# Patient Record
Sex: FEMALE | Race: OTHER | HISPANIC OR LATINO | Employment: UNEMPLOYED | URBAN - METROPOLITAN AREA
[De-identification: names, ages, dates, MRNs, and addresses within clinical notes are randomized per-mention and may not be internally consistent; named-entity substitution may affect disease eponyms.]

---

## 2017-01-07 ENCOUNTER — HOSPITAL ENCOUNTER (OUTPATIENT)
Dept: CT IMAGING | Facility: HOSPITAL | Age: 37
Discharge: HOME | End: 2017-01-07
Payer: COMMERCIAL

## 2017-01-07 DIAGNOSIS — R10.9 ABDOMINAL PAIN: ICD-10-CM

## 2017-01-07 DIAGNOSIS — R10.11 RIGHT UPPER QUADRANT PAIN: ICD-10-CM

## 2017-01-07 DIAGNOSIS — R10.33 PERIUMBILICAL PAIN: ICD-10-CM

## 2017-01-07 PROCEDURE — 74177 CT ABD & PELVIS W/CONTRAST: CPT

## 2017-01-07 RX ADMIN — IOHEXOL 100 ML: 350 INJECTION, SOLUTION INTRAVENOUS at 10:15

## 2017-01-09 ENCOUNTER — GENERIC CONVERSION - ENCOUNTER (OUTPATIENT)
Dept: OTHER | Facility: OTHER | Age: 37
End: 2017-01-09

## 2017-01-09 DIAGNOSIS — R10.84 GENERALIZED ABDOMINAL PAIN: ICD-10-CM

## 2017-02-07 ENCOUNTER — HOSPITAL ENCOUNTER (EMERGENCY)
Facility: HOSPITAL | Age: 37
Discharge: HOME | End: 2017-02-08
Attending: EMERGENCY MEDICINE | Admitting: EMERGENCY MEDICINE
Payer: COMMERCIAL

## 2017-02-07 DIAGNOSIS — K52.9 GASTROENTERITIS: Primary | ICD-10-CM

## 2017-02-07 LAB
ALBUMIN SERPL BCP-MCNC: 4.1 G/DL (ref 3.5–5)
ALP SERPL-CCNC: 69 U/L (ref 46–116)
ALT SERPL W P-5'-P-CCNC: 33 U/L (ref 12–78)
ANION GAP SERPL CALCULATED.3IONS-SCNC: 12 MMOL/L (ref 4–13)
AST SERPL W P-5'-P-CCNC: 23 U/L (ref 5–45)
BASOPHILS # BLD MANUAL: 0 THOUSAND/UL (ref 0–0.1)
BASOPHILS NFR MAR MANUAL: 0 % (ref 0–1)
BILIRUB SERPL-MCNC: 1.3 MG/DL (ref 0.2–1)
BUN SERPL-MCNC: 15 MG/DL (ref 5–25)
CALCIUM SERPL-MCNC: 8.6 MG/DL (ref 8.3–10.1)
CHLORIDE SERPL-SCNC: 100 MMOL/L (ref 100–108)
CO2 SERPL-SCNC: 25 MMOL/L (ref 21–32)
CREAT SERPL-MCNC: 0.76 MG/DL (ref 0.6–1.3)
EOSINOPHIL # BLD MANUAL: 0.1 THOUSAND/UL (ref 0–0.4)
EOSINOPHIL NFR BLD MANUAL: 1 % (ref 0–6)
ERYTHROCYTE [DISTWIDTH] IN BLOOD BY AUTOMATED COUNT: 12.4 % (ref 11.6–15.1)
GFR SERPL CREATININE-BSD FRML MDRD: >60 ML/MIN/1.73SQ M
GLUCOSE SERPL-MCNC: 130 MG/DL (ref 65–140)
HCT VFR BLD AUTO: 38.4 % (ref 34.8–46.1)
HGB BLD-MCNC: 12.8 G/DL (ref 11.5–15.4)
LIPASE SERPL-CCNC: 111 U/L (ref 73–393)
LYMPHOCYTES # BLD AUTO: 0.81 THOUSAND/UL (ref 0.6–4.47)
LYMPHOCYTES # BLD AUTO: 8 % (ref 14–44)
MCH RBC QN AUTO: 29.2 PG (ref 26.8–34.3)
MCHC RBC AUTO-ENTMCNC: 33.3 G/DL (ref 31.4–37.4)
MCV RBC AUTO: 88 FL (ref 82–98)
MONOCYTES # BLD AUTO: 0.1 THOUSAND/UL (ref 0–1.22)
MONOCYTES NFR BLD: 1 % (ref 4–12)
NEUTROPHILS # BLD MANUAL: 9.05 THOUSAND/UL (ref 1.85–7.62)
NEUTS BAND NFR BLD MANUAL: 4 % (ref 0–8)
NEUTS SEG NFR BLD AUTO: 85 % (ref 43–75)
PLATELET # BLD AUTO: 144 THOUSANDS/UL (ref 149–390)
PLATELET BLD QL SMEAR: ADEQUATE
PMV BLD AUTO: 11.3 FL (ref 8.9–12.7)
POTASSIUM SERPL-SCNC: 3.4 MMOL/L (ref 3.5–5.3)
PROT SERPL-MCNC: 7.7 G/DL (ref 6.4–8.2)
RBC # BLD AUTO: 4.39 MILLION/UL (ref 3.81–5.12)
SODIUM SERPL-SCNC: 137 MMOL/L (ref 136–145)
TOTAL CELLS COUNTED SPEC: 100
VARIANT LYMPHS # BLD AUTO: 1 %
WBC # BLD AUTO: 10.17 THOUSAND/UL (ref 4.31–10.16)

## 2017-02-07 PROCEDURE — 36415 COLL VENOUS BLD VENIPUNCTURE: CPT | Performed by: EMERGENCY MEDICINE

## 2017-02-07 PROCEDURE — 83690 ASSAY OF LIPASE: CPT | Performed by: EMERGENCY MEDICINE

## 2017-02-07 PROCEDURE — 85007 BL SMEAR W/DIFF WBC COUNT: CPT | Performed by: EMERGENCY MEDICINE

## 2017-02-07 PROCEDURE — 80053 COMPREHEN METABOLIC PANEL: CPT | Performed by: EMERGENCY MEDICINE

## 2017-02-07 PROCEDURE — 96361 HYDRATE IV INFUSION ADD-ON: CPT

## 2017-02-07 PROCEDURE — 96374 THER/PROPH/DIAG INJ IV PUSH: CPT

## 2017-02-07 PROCEDURE — 85027 COMPLETE CBC AUTOMATED: CPT | Performed by: EMERGENCY MEDICINE

## 2017-02-07 RX ORDER — ONDANSETRON 4 MG/1
4 TABLET, FILM COATED ORAL EVERY 8 HOURS PRN
Qty: 15 TABLET | Refills: 0 | Status: SHIPPED | OUTPATIENT
Start: 2017-02-07 | End: 2017-12-14 | Stop reason: ALTCHOICE

## 2017-02-07 RX ORDER — LOPERAMIDE HYDROCHLORIDE 2 MG/1
2 CAPSULE ORAL ONCE
Status: COMPLETED | OUTPATIENT
Start: 2017-02-07 | End: 2017-02-07

## 2017-02-07 RX ORDER — ONDANSETRON 2 MG/ML
4 INJECTION INTRAMUSCULAR; INTRAVENOUS ONCE
Status: COMPLETED | OUTPATIENT
Start: 2017-02-07 | End: 2017-02-07

## 2017-02-07 RX ADMIN — LOPERAMIDE HYDROCHLORIDE 2 MG: 2 CAPSULE ORAL at 23:50

## 2017-02-07 RX ADMIN — ONDANSETRON 4 MG: 2 INJECTION INTRAMUSCULAR; INTRAVENOUS at 23:51

## 2017-02-07 RX ADMIN — SODIUM CHLORIDE 1000 ML: 0.9 INJECTION, SOLUTION INTRAVENOUS at 23:50

## 2017-02-08 VITALS
HEIGHT: 64 IN | WEIGHT: 180 LBS | RESPIRATION RATE: 16 BRPM | OXYGEN SATURATION: 100 % | BODY MASS INDEX: 30.73 KG/M2 | DIASTOLIC BLOOD PRESSURE: 59 MMHG | HEART RATE: 100 BPM | SYSTOLIC BLOOD PRESSURE: 100 MMHG | TEMPERATURE: 101.5 F

## 2017-02-08 PROCEDURE — 99283 EMERGENCY DEPT VISIT LOW MDM: CPT

## 2017-02-08 PROCEDURE — 96361 HYDRATE IV INFUSION ADD-ON: CPT

## 2017-02-08 RX ORDER — ACETAMINOPHEN 325 MG/1
650 TABLET ORAL ONCE
Status: COMPLETED | OUTPATIENT
Start: 2017-02-08 | End: 2017-02-08

## 2017-02-08 RX ORDER — ACETAMINOPHEN 325 MG/1
TABLET ORAL
Status: COMPLETED
Start: 2017-02-08 | End: 2017-02-08

## 2017-02-08 RX ADMIN — ACETAMINOPHEN 650 MG: 325 TABLET, FILM COATED ORAL at 01:56

## 2017-02-08 RX ADMIN — ACETAMINOPHEN 650 MG: 325 TABLET ORAL at 01:56

## 2017-04-01 ENCOUNTER — LAB REQUISITION (OUTPATIENT)
Dept: LAB | Facility: HOSPITAL | Age: 37
End: 2017-04-01
Payer: COMMERCIAL

## 2017-04-01 ENCOUNTER — ALLSCRIPTS OFFICE VISIT (OUTPATIENT)
Dept: OTHER | Facility: OTHER | Age: 37
End: 2017-04-01

## 2017-04-01 DIAGNOSIS — K59.09 OTHER CONSTIPATION: ICD-10-CM

## 2017-04-01 DIAGNOSIS — R73.01 IMPAIRED FASTING GLUCOSE: ICD-10-CM

## 2017-04-01 DIAGNOSIS — N39.0 URINARY TRACT INFECTION: ICD-10-CM

## 2017-04-01 PROCEDURE — 87086 URINE CULTURE/COLONY COUNT: CPT | Performed by: FAMILY MEDICINE

## 2017-04-02 LAB — BACTERIA UR CULT: NORMAL

## 2017-04-22 ENCOUNTER — LAB CONVERSION - ENCOUNTER (OUTPATIENT)
Dept: OTHER | Facility: OTHER | Age: 37
End: 2017-04-22

## 2017-04-22 LAB
A/G RATIO (HISTORICAL): 1.7 (CALC) (ref 1–2.5)
ALBUMIN SERPL BCP-MCNC: 4.1 G/DL (ref 3.6–5.1)
ALP SERPL-CCNC: 57 U/L (ref 33–115)
ALT SERPL W P-5'-P-CCNC: 20 U/L (ref 6–29)
AST SERPL W P-5'-P-CCNC: 20 U/L (ref 10–30)
BILIRUB SERPL-MCNC: 1 MG/DL (ref 0.2–1.2)
BUN SERPL-MCNC: 9 MG/DL (ref 7–25)
BUN/CREA RATIO (HISTORICAL): NORMAL (CALC) (ref 6–22)
CALCIUM SERPL-MCNC: 9 MG/DL (ref 8.6–10.2)
CHLORIDE SERPL-SCNC: 103 MMOL/L (ref 98–110)
CHOLEST SERPL-MCNC: 134 MG/DL (ref 125–200)
CHOLEST/HDLC SERPL: 2.7 (CALC)
CO2 SERPL-SCNC: 29 MMOL/L (ref 20–31)
CREAT SERPL-MCNC: 0.69 MG/DL (ref 0.5–1.1)
EGFR AFRICAN AMERICAN (HISTORICAL): 131 ML/MIN/1.73M2
EGFR-AMERICAN CALC (HISTORICAL): 113 ML/MIN/1.73M2
GAMMA GLOBULIN (HISTORICAL): 2.4 G/DL (CALC) (ref 1.9–3.7)
GLUCOSE (HISTORICAL): 97 MG/DL (ref 65–99)
HBA1C MFR BLD HPLC: 5.6 % OF TOTAL HGB
HDLC SERPL-MCNC: 50 MG/DL
LDL CHOLESTEROL (HISTORICAL): 68 MG/DL (CALC)
NON-HDL-CHOL (CHOL-HDL) (HISTORICAL): 84 MG/DL (CALC)
POTASSIUM SERPL-SCNC: 3.8 MMOL/L (ref 3.5–5.3)
SODIUM SERPL-SCNC: 137 MMOL/L (ref 135–146)
TOTAL PROTEIN (HISTORICAL): 6.5 G/DL (ref 6.1–8.1)
TRIGL SERPL-MCNC: 80 MG/DL
TSH SERPL DL<=0.05 MIU/L-ACNC: 0.94 MIU/L

## 2017-04-24 ENCOUNTER — GENERIC CONVERSION - ENCOUNTER (OUTPATIENT)
Dept: OTHER | Facility: OTHER | Age: 37
End: 2017-04-24

## 2017-04-29 ENCOUNTER — HOSPITAL ENCOUNTER (EMERGENCY)
Facility: HOSPITAL | Age: 37
Discharge: HOME | End: 2017-04-29
Payer: COMMERCIAL

## 2017-04-29 ENCOUNTER — APPOINTMENT (EMERGENCY)
Dept: RADIOLOGY | Facility: HOSPITAL | Age: 37
End: 2017-04-29
Payer: COMMERCIAL

## 2017-04-29 VITALS
DIASTOLIC BLOOD PRESSURE: 63 MMHG | WEIGHT: 180 LBS | OXYGEN SATURATION: 98 % | BODY MASS INDEX: 30.73 KG/M2 | RESPIRATION RATE: 18 BRPM | TEMPERATURE: 97.6 F | HEIGHT: 64 IN | HEART RATE: 57 BPM | SYSTOLIC BLOOD PRESSURE: 114 MMHG

## 2017-04-29 DIAGNOSIS — F41.9 ANXIETY: ICD-10-CM

## 2017-04-29 DIAGNOSIS — R07.89 CHEST PRESSURE: Primary | ICD-10-CM

## 2017-04-29 DIAGNOSIS — E87.6 HYPOKALEMIA: ICD-10-CM

## 2017-04-29 LAB
ANION GAP SERPL CALCULATED.3IONS-SCNC: 12 MMOL/L (ref 4–13)
BASOPHILS # BLD AUTO: 0.03 THOUSANDS/ΜL (ref 0–0.1)
BASOPHILS NFR BLD AUTO: 0 % (ref 0–1)
BUN SERPL-MCNC: 8 MG/DL (ref 5–25)
CALCIUM SERPL-MCNC: 9.1 MG/DL (ref 8.3–10.1)
CHLORIDE SERPL-SCNC: 101 MMOL/L (ref 100–108)
CO2 SERPL-SCNC: 28 MMOL/L (ref 21–32)
CREAT SERPL-MCNC: 0.74 MG/DL (ref 0.6–1.3)
EOSINOPHIL # BLD AUTO: 0.08 THOUSAND/ΜL (ref 0–0.61)
EOSINOPHIL NFR BLD AUTO: 1 % (ref 0–6)
ERYTHROCYTE [DISTWIDTH] IN BLOOD BY AUTOMATED COUNT: 12.6 % (ref 11.6–15.1)
GFR SERPL CREATININE-BSD FRML MDRD: >60 ML/MIN/1.73SQ M
GLUCOSE SERPL-MCNC: 99 MG/DL (ref 65–140)
HCT VFR BLD AUTO: 37.4 % (ref 34.8–46.1)
HGB BLD-MCNC: 12.6 G/DL (ref 11.5–15.4)
LYMPHOCYTES # BLD AUTO: 2.07 THOUSANDS/ΜL (ref 0.6–4.47)
LYMPHOCYTES NFR BLD AUTO: 27 % (ref 14–44)
MCH RBC QN AUTO: 30 PG (ref 26.8–34.3)
MCHC RBC AUTO-ENTMCNC: 33.7 G/DL (ref 31.4–37.4)
MCV RBC AUTO: 89 FL (ref 82–98)
MONOCYTES # BLD AUTO: 0.56 THOUSAND/ΜL (ref 0.17–1.22)
MONOCYTES NFR BLD AUTO: 7 % (ref 4–12)
NEUTROPHILS # BLD AUTO: 4.97 THOUSANDS/ΜL (ref 1.85–7.62)
NEUTS SEG NFR BLD AUTO: 65 % (ref 43–75)
PLATELET # BLD AUTO: 168 THOUSANDS/UL (ref 149–390)
PMV BLD AUTO: 11.2 FL (ref 8.9–12.7)
POTASSIUM SERPL-SCNC: 3.3 MMOL/L (ref 3.5–5.3)
RBC # BLD AUTO: 4.2 MILLION/UL (ref 3.81–5.12)
SODIUM SERPL-SCNC: 141 MMOL/L (ref 136–145)
TROPONIN I SERPL-MCNC: <0.02 NG/ML
WBC # BLD AUTO: 7.71 THOUSAND/UL (ref 4.31–10.16)

## 2017-04-29 PROCEDURE — 80048 BASIC METABOLIC PNL TOTAL CA: CPT | Performed by: PHYSICIAN ASSISTANT

## 2017-04-29 PROCEDURE — 36415 COLL VENOUS BLD VENIPUNCTURE: CPT | Performed by: PHYSICIAN ASSISTANT

## 2017-04-29 PROCEDURE — 93005 ELECTROCARDIOGRAM TRACING: CPT | Performed by: PHYSICIAN ASSISTANT

## 2017-04-29 PROCEDURE — 99285 EMERGENCY DEPT VISIT HI MDM: CPT

## 2017-04-29 PROCEDURE — 71020 HB CHEST X-RAY 2VW FRONTAL&LATL: CPT

## 2017-04-29 PROCEDURE — 84484 ASSAY OF TROPONIN QUANT: CPT | Performed by: PHYSICIAN ASSISTANT

## 2017-04-29 PROCEDURE — 85025 COMPLETE CBC W/AUTO DIFF WBC: CPT | Performed by: PHYSICIAN ASSISTANT

## 2017-04-29 RX ORDER — ASPIRIN 81 MG/1
324 TABLET, CHEWABLE ORAL ONCE
Status: COMPLETED | OUTPATIENT
Start: 2017-04-29 | End: 2017-04-29

## 2017-04-29 RX ADMIN — ASPIRIN 81 MG CHEWABLE TABLET 324 MG: 81 TABLET CHEWABLE at 21:10

## 2017-05-01 LAB
ATRIAL RATE: 57 BPM
P AXIS: 65 DEGREES
PR INTERVAL: 160 MS
QRS AXIS: 50 DEGREES
QRSD INTERVAL: 76 MS
QT INTERVAL: 436 MS
QTC INTERVAL: 424 MS
T WAVE AXIS: 73 DEGREES
VENTRICULAR RATE: 57 BPM

## 2017-09-07 ENCOUNTER — ALLSCRIPTS OFFICE VISIT (OUTPATIENT)
Dept: OTHER | Facility: OTHER | Age: 37
End: 2017-09-07

## 2017-11-06 ENCOUNTER — ALLSCRIPTS OFFICE VISIT (OUTPATIENT)
Dept: OTHER | Facility: OTHER | Age: 37
End: 2017-11-06

## 2017-11-07 NOTE — PROGRESS NOTES
Assessment  1  Acute bacterial sinusitis (461 9) (J01 90,B96 89)    Plan  Acute bacterial sinusitis    · Amoxicillin-Pot Clavulanate 875-125 MG Oral Tablet (Augmentin); TAKE 1 TABLET  TWICE DAILY AFTER MEALS FOR 7 DAYS   · Fluticasone Propionate 50 MCG/ACT Nasal Suspension; USE 2 SPRAYS IN EACH  NOSTRIL ONCE DAILY    Discussion/Summary    Acute sinusitis - start OTC decongestant and will send rx for Flonase and Augmentin, advised rest/fluids/lozenges/hot tea, call if not better at all in 7 days or with new/worsening symptoms  The patient, patient's family was counseled regarding instructions for management,-- risk factor reductions,-- prognosis,-- patient and family education,-- impressions,-- risks and benefits of treatment options,-- importance of compliance with treatment  Possible side effects of new medications were reviewed with the patient/guardian today  The treatment plan was reviewed with the patient/guardian  The patient/guardian understands and agrees with the treatment plan     Self Referrals: No      Chief Complaint  sick visit      History of Present Illness  HPI: Pt here with 6-7 days of cold symptoms  Symptoms started with nasal congestion but have progressed and now has sinus pain and pressure and HA's  She has had some chills but no fever  She notes a little ear pressure with blowing her nose but notes no pain/drainage  She had a ST but that resolved  She notes some nausea but no vomiting or diarrhea/urinary symptoms/rashes  She has used OTC nasal spray which helps but is short lived  She has not used a decongestant  Review of Systems    Constitutional: feeling poorly-- and-- chills, but-- as noted in HPI-- and-- no fever  ENT: earache-- and-- nasal discharge, but-- as noted in HPI-- and-- no sore throat  Respiratory: no shortness of breath,-- no cough-- and-- no wheezing  Gastrointestinal: nausea, but-- no abdominal pain-- and-- no diarrhea  Genitourinary: no dysuria  Musculoskeletal: no arthralgias-- and-- no myalgias  Integumentary: no rashes  Neurological: headache, but-- no dizziness  Active Problems  1  History of Abdominal pain, right upper quadrant (789 01) (R10 11)   2  History of Bilateral lower abdominal discomfort (789 03,789 04) (R10 31,R10 32)   3  Chronic cholecystitis (575 11) (K81 1)   4  Chronic constipation (564 00) (K59 09)   5  Dental disorder (525 9) (K08 9)   6  Encounter for routine pelvic examination (V72 31) (Z01 419)   7  Esophagitis, reflux (530 11) (K21 0)   8  Fibromyalgia (729 1) (M79 7)   9  Flu vaccine need (V04 81) (Z23)   10  Headache (784 0) (R51)   11  History of sore throat (V12 60) (Z87 09)   12  Hypoglycemia (251 2) (E16 2)   13  Impaired fasting glucose (790 21) (R73 01)   14  History of Neck muscle spasm (728 85) (M62 838)   15  History of Rib pain on right side (786 50) (R07 81)   16  History of Right facial numbness (782 0) (R20 0)   17  Tension headache (307 81) (G44 209)    Past Medical History  Active Problems And Past Medical History Reviewed: The active problems and past medical history were reviewed and updated today  Surgical History  Surgical History Reviewed: The surgical history was reviewed and updated today  Social History   · Caffeine Use   · Never A Smoker   · No drug use   · Support At Home For Breast-feeding  The social history was reviewed and updated today  Family History  Family History Reviewed: The family history was reviewed and updated today  Current Meds   1  Leslie Contour Test In [employer]; USE 1 STRIP TWICE DAILY; Therapy: 87QUI1465 to ([phone])  Requested for: 77NTZ9578; Last   [id number]:[id number] Ordered   2  Multiple Vitamins Oral Tablet Recorded   3  Polyethylene Glycol 3350 Oral Packet; PACK PO;   Therapy: 42XQE0105 to (Last Rx:09Nov2015)  Requested for: 93MKV8416 Ordered   4  RaNITidine HCl - 150 MG Oral Tablet; TAKE 1 TABLET AT BEDTIME;    Therapy: 67GGL3775 to (Evaluate:21Oct2015); Last Rx:23Jun2015 Ordered    The medication list was reviewed and updated today  Allergies  1  No Known Drug Allergies    Vitals   Recorded: 39TMM4768 11:08AM   Temperature 98 F   Heart Rate 72   Systolic 102   Diastolic 76   Height 5 ft 4 in   Weight 178 lb 3 2 oz   BMI Calculated 30 59   BSA Calculated 1 86     Physical Exam    Constitutional   General appearance: No acute distress, well appearing and well nourished  Ears, Nose, Mouth, and Throat   External inspection of ears and nose: Normal     Otoscopic examination: Abnormal  -- effusion R TM but light reflex intact and no erythema present  Oropharynx: Normal with no erythema, edema, exudate or lesions  Pulmonary   Respiratory effort: No increased work of breathing or signs of respiratory distress  Auscultation of lungs: Clear to auscultation  Cardiovascular   Auscultation of heart: Normal rate and rhythm, normal S1 and S2, without murmurs  Lymphatic   Palpation of lymph nodes in neck: Abnormal  -- B/L submandibular adenopathy     Musculoskeletal   Gait and station: Normal     Psychiatric   Mood and affect: Normal          Signatures   Electronically signed by : Rigo Carlisle DO; Nov 6 2017 11:27AM EST                       (Author)

## 2017-11-30 ENCOUNTER — ALLSCRIPTS OFFICE VISIT (OUTPATIENT)
Dept: OTHER | Facility: OTHER | Age: 37
End: 2017-11-30

## 2017-12-05 NOTE — PROGRESS NOTES
Assessment    1  Acute bacterial sinusitis (461 9) (J01 90,B96 89)   2  Tingling (782 0) (R20 2)    Discussion/Summary    Patient had completed a course of Augmentin  The symptoms are improved  Down there is residual sinus congestion  I have advised her to continue to use Flonase twice a day for a week then go once a day every day  Advised that add Claritin D once a day  Increase fluids and monitor symptoms  Patient with ill-defined tingling intermittently on various parts of the body  There has been no associated sensory or motor deficits  No no other neurologic symptoms  Previous labs have all been unremarkable  She does have a low vitamin-D  Advised the continue vitamin-D  I have advise to try a B complex vitamin for now  She will continue to monitor her symptoms if the symptoms continue on may have to consider further workup to include further blood work and consider imaging such as an MRI of the brain  Chief Complaint  Patient complains of chest congestion  History of Present Illness  HPI: Patient is here for follow-up  Was diagnosed with acute bacterial sinusitis earlier this month  She come pleaded a course of Augmentin  She has essentially improved but continues to have left-sided sinus congestion  She was on Flonase but is not taking this regularly  She has congestion  No fever no chills  She has some dental pain  No ear pain  Also intermittently having tingling in various parts of her body  There is no lack of sensation  There is no dizziness or lightheadedness  There is no weakness  There is no numbness  No aggravating or alleviating symptoms  Also has multiple body aches but does have a known history of fibromyalgia  Seems to have some association with headaches  she has had a history of migraines  She also has a history of fibromyalgia  Review of Systems    Constitutional: No fever, no chills, feels well, no tiredness, no recent weight gain or loss     Cardiovascular: no complaints of slow or fast heart rate, no chest pain, no palpitations, no leg claudication or lower extremity edema  Respiratory: no complaints of shortness of breath, no wheezing, no dyspnea on exertion, no orthopnea or PND  Active Problems    1  History of Abdominal pain, right upper quadrant (789 01) (R10 11)   2  Acute bacterial sinusitis (461 9) (J01 90,B96 89)   3  History of Bilateral lower abdominal discomfort (789 03,789 04) (R10 31,R10 32)   4  Chronic cholecystitis (575 11) (K81 1)   5  Chronic constipation (564 00) (K59 09)   6  Dental disorder (525 9) (K08 9)   7  Encounter for routine pelvic examination (V72 31) (Z01 419)   8  Esophagitis, reflux (530 11) (K21 0)   9  Fibromyalgia (729 1) (M79 7)   10  Flu vaccine need (V04 81) (Z23)   11  Headache (784 0) (R51)   12  History of sore throat (V12 60) (Z87 09)   13  Hypoglycemia (251 2) (E16 2)   14  Impaired fasting glucose (790 21) (R73 01)   15  History of Neck muscle spasm (728 85) (M62 838)   16  History of Rib pain on right side (786 50) (R07 81)   17  History of Right facial numbness (782 0) (R20 0)   18  Tension headache (307 81) (G44 209)    Past Medical History    1  History of Abdominal pain, periumbilical (672 35) (R26 79)   2  History of Abdominal pain, right upper quadrant (789 01) (R10 11)   3  History of Acute conjunctivitis of both eyes (372 00) (H10 33)   4  History of Acute lower UTI (urinary tract infection) (599 0) (N39 0)   5  History of Acute URI (465 9) (J06 9)   6  Atypical chest pain (786 59) (R07 89)   7  History of Bilateral lower abdominal discomfort (789 03,789 04) (R10 31,R10 32)   8  History of Carpal tunnel syndrome, unspecified laterality (354 0) (G56 00)   9  Chronic constipation (564 00) (K59 09)   10  History of Contact dermatitis due to poison ivy (692 6) (L23 7)   11  Dental disorder (525 9) (K08 9)   12  History of Dysuria (788 1) (R30 0)   13  History of Generalized abdominal pain (789 07) (R10 84)   14  History of abdominal pain (V13 89) (Z87 898)   15  History of acute bronchitis (V12 69) (Z87 09)   16  History of acute pharyngitis (V12 69) (Z87 09)   17  History of cholelithiasis (V12 79) (Z87 19)   18  History of dizziness (V13 89) (Z87 898)   19  History of headache (V13 89) (Z87 898)   20  History of hypoglycemia (V12 29) (Z86 39)   21  History of nausea and vomiting (V12 79) (Z87 898)   22  History of sore throat (V12 60) (Z87 09)   23  History of urinary frequency (V13 09) (Z87 898)   24  History of urinary tract infection (V13 02) (Z87 440)   25  Impaired fasting glucose (790 21) (R73 01)   26  History of Neck muscle spasm (728 85) (M62 838)   27  History of Rib pain on right side (786 50) (R07 81)   28  History of Right facial numbness (782 0) (R20 0)   29  History of Shoulder pain, right (719 41) (M25 511)   30  Tension headache (307 81) (G44 209)   31  Tingling (782 0) (R20 2)   32  History of Umbilical hernia (525 7) (K42 9)    Family History  Mother    1  Family history of Anemia (V18 2)   2  Family history of Hypertension (V17 49)   3  Family history of Type 2 Diabetes Mellitus  Father    4  Family history of Type 2 Diabetes Mellitus  Family History    5  Family history of Arthritis (V17 7)   6  Family history of Coronary Artery Disease (V17 49)   7  Family history of Diabetes Mellitus (V18 0)   8  Family history of Hyperlipidemia   9  Family history of Osteoporosis (V17 81)    Social History    · Caffeine Use   · Never A Smoker   · No drug use   · Support At Home For Breast-feeding  The social history was reviewed and updated today  Surgical History    1  History of Cholecystectomy Laparoscopic   2  History of Tubal Ligation   3  History of Umbilical Hernia Repair    Current Meds   1  Leslie Contour Test In [employer]; USE 1 STRIP TWICE DAILY; Therapy: 92RNU5525 to (Teresa Hermilo)  Requested for: 65YVX9405; Last   EJ:13PTQ8765 Ordered   2   Fluticasone Propionate 50 MCG/ACT Nasal Suspension; USE 2 SPRAYS IN EACH   NOSTRIL ONCE DAILY; Therapy: 46VYN3482 to (Last XW:07IGB4091)  Requested for: 94DLS2624 Ordered   3  Multiple Vitamins Oral Tablet Recorded   4  Polyethylene Glycol 3350 Oral Packet; PACK PO;   Therapy: 67TFO1138 to (Last Rx:09Nov2015)  Requested for: 05WTW7234 Ordered   5  RaNITidine HCl - 150 MG Oral Tablet; TAKE 1 TABLET AT BEDTIME; Therapy: 68GYE2488 to (Evaluate:21Oct2015); Last Rx:23Jun2015 Ordered    Allergies    1  No Known Drug Allergies    Vitals   Recorded: 63BWB6352 09:42AM   Temperature 99 1 F   Heart Rate 76   Systolic 726   Diastolic 80   Height 5 ft 4 in   Weight 176 lb 9 6 oz   BMI Calculated 30 31   BSA Calculated 1 86     Physical Exam    Constitutional   General appearance: No acute distress, well appearing and well nourished  Eyes   Conjunctiva and lids: No swelling, erythema or discharge  Pupils and irises: Equal, round and reactive to light  Ears, Nose, Mouth, and Throat   External inspection of ears and nose: Normal     Otoscopic examination: Tympanic membranes translucent with normal light reflex  Canals patent without erythema  Nasal mucosa, septum, and turbinates: Normal without edema or erythema  Oropharynx: Normal with no erythema, edema, exudate or lesions  Pulmonary   Respiratory effort: No increased work of breathing or signs of respiratory distress  Auscultation of lungs: Clear to auscultation  Cardiovascular   Auscultation of heart: Normal rate and rhythm, normal S1 and S2, without murmurs  Examination of extremities for edema and/or varicosities: Normal          Results/Data  PHQ-2 Adult Depression Screening 86JDF0305 09:54AM User, Yoink Gamess     Test Name Result Flag Reference   PHQ-2 Adult Depression Score 0     Over the last two weeks, how often have you been bothered by any of the following problems?   Little interest or pleasure in doing things: Not at all - 0  Feeling down, depressed, or hopeless: Not at all - 0 PHQ-2 Adult Depression Screening Negative         Signatures   Electronically signed by : Gordo Linares MD; Nov 30 2017 12:41PM EST                       (Author)

## 2017-12-11 ENCOUNTER — GENERIC CONVERSION - ENCOUNTER (OUTPATIENT)
Dept: OTHER | Facility: OTHER | Age: 37
End: 2017-12-11

## 2017-12-11 DIAGNOSIS — R20.0 ANESTHESIA OF SKIN: ICD-10-CM

## 2017-12-11 DIAGNOSIS — R20.2 PARESTHESIA OF SKIN: ICD-10-CM

## 2017-12-11 LAB
A/G RATIO (HISTORICAL): 1.6 (ref 1.2–2.2)
ALBUMIN SERPL BCP-MCNC: 4.2 G/DL (ref 3.5–5.5)
ALP SERPL-CCNC: 61 IU/L (ref 39–117)
ALT SERPL W P-5'-P-CCNC: 17 IU/L (ref 0–32)
AST SERPL W P-5'-P-CCNC: 16 IU/L (ref 0–40)
BASOPHILS # BLD AUTO: 0 %
BASOPHILS # BLD AUTO: 0 X10E3/UL (ref 0–0.2)
BILIRUB SERPL-MCNC: 0.5 MG/DL (ref 0–1.2)
BUN SERPL-MCNC: 7 MG/DL (ref 6–20)
BUN/CREA RATIO (HISTORICAL): 10 (ref 9–23)
CALCIUM SERPL-MCNC: 9.3 MG/DL (ref 8.7–10.2)
CHLORIDE SERPL-SCNC: 100 MMOL/L (ref 96–106)
CO2 SERPL-SCNC: 26 MMOL/L (ref 18–29)
CREAT SERPL-MCNC: 0.67 MG/DL (ref 0.57–1)
DEPRECATED RDW RBC AUTO: 13.4 % (ref 12.3–15.4)
EGFR AFRICAN AMERICAN (HISTORICAL): 131 ML/MIN/1.73
EGFR-AMERICAN CALC (HISTORICAL): 113 ML/MIN/1.73
EOSINOPHIL # BLD AUTO: 0.1 X10E3/UL (ref 0–0.4)
EOSINOPHIL # BLD AUTO: 2 %
ERYTHROCYTE SEDIMENTATION RATE (HISTORICAL): 8 MM/HR (ref 0–32)
GLUCOSE SERPL-MCNC: 101 MG/DL (ref 65–99)
HCT VFR BLD AUTO: 37.6 % (ref 34–46.6)
HGB BLD-MCNC: 12.4 G/DL (ref 11.1–15.9)
IMM.GRANULOCYTES (CD4/8) (HISTORICAL): 0 %
IMM.GRANULOCYTES (CD4/8) (HISTORICAL): 0 X10E3/UL (ref 0–0.1)
LYMPHOCYTES # BLD AUTO: 1.5 X10E3/UL (ref 0.7–3.1)
LYMPHOCYTES # BLD AUTO: 29 %
MAGNESIUM SERPL-MCNC: 2.1 MG/DL (ref 1.6–2.3)
MCH RBC QN AUTO: 29.5 PG (ref 26.6–33)
MCHC RBC AUTO-ENTMCNC: 33 G/DL (ref 31.5–35.7)
MCV RBC AUTO: 89 FL (ref 79–97)
MONOCYTES # BLD AUTO: 0.2 X10E3/UL (ref 0.1–0.9)
MONOCYTES (HISTORICAL): 5 %
NEUTROPHILS # BLD AUTO: 3.3 X10E3/UL (ref 1.4–7)
NEUTROPHILS # BLD AUTO: 64 %
PLATELET # BLD AUTO: 206 X10E3/UL (ref 150–379)
POTASSIUM SERPL-SCNC: 4.1 MMOL/L (ref 3.5–5.2)
RBC (HISTORICAL): 4.21 X10E6/UL (ref 3.77–5.28)
SODIUM SERPL-SCNC: 140 MMOL/L (ref 134–144)
TOT. GLOBULIN, SERUM (HISTORICAL): 2.6 G/DL (ref 1.5–4.5)
TOTAL PROTEIN (HISTORICAL): 6.8 G/DL (ref 6–8.5)
WBC # BLD AUTO: 5.2 X10E3/UL (ref 3.4–10.8)

## 2017-12-12 LAB
ANTI-NUCLEAR ANTIBODY (ANA) (HISTORICAL): NEGATIVE
C-REACT.PROTEIN,QUANT (HISTORICAL): 1.5 MG/L (ref 0–4.9)
LYME IGG/IGM AB (HISTORICAL): <0.91 ISR (ref 0–0.9)
LYME IGM (HISTORICAL): <0.8 INDEX (ref 0–0.79)
TSH SERPL DL<=0.05 MIU/L-ACNC: 1.12 UIU/ML (ref 0.45–4.5)
VIT B12 SERPL-MCNC: 433 PG/ML (ref 232–1245)

## 2017-12-13 ENCOUNTER — GENERIC CONVERSION - ENCOUNTER (OUTPATIENT)
Dept: FAMILY MEDICINE CLINIC | Facility: HOSPITAL | Age: 37
End: 2017-12-13

## 2017-12-14 ENCOUNTER — GENERIC CONVERSION - ENCOUNTER (OUTPATIENT)
Dept: OTHER | Facility: OTHER | Age: 37
End: 2017-12-14

## 2017-12-14 ENCOUNTER — HOSPITAL ENCOUNTER (EMERGENCY)
Facility: HOSPITAL | Age: 37
Discharge: HOME | End: 2017-12-14
Admitting: EMERGENCY MEDICINE
Payer: COMMERCIAL

## 2017-12-14 VITALS
OXYGEN SATURATION: 99 % | WEIGHT: 185 LBS | TEMPERATURE: 97.2 F | HEART RATE: 76 BPM | BODY MASS INDEX: 31.76 KG/M2 | SYSTOLIC BLOOD PRESSURE: 103 MMHG | DIASTOLIC BLOOD PRESSURE: 64 MMHG | RESPIRATION RATE: 18 BRPM

## 2017-12-14 DIAGNOSIS — K52.9 GASTROENTERITIS: Primary | ICD-10-CM

## 2017-12-14 LAB
ALBUMIN SERPL BCP-MCNC: 4 G/DL (ref 3.5–5)
ALP SERPL-CCNC: 103 U/L (ref 46–116)
ALT SERPL W P-5'-P-CCNC: 172 U/L (ref 12–78)
ANION GAP SERPL CALCULATED.3IONS-SCNC: 9 MMOL/L (ref 4–13)
AST SERPL W P-5'-P-CCNC: 166 U/L (ref 5–45)
BASOPHILS # BLD AUTO: 0 THOUSANDS/ΜL (ref 0–0.1)
BASOPHILS NFR BLD AUTO: 0 % (ref 0–1)
BILIRUB SERPL-MCNC: 1 MG/DL (ref 0.2–1)
BUN SERPL-MCNC: 6 MG/DL (ref 5–25)
CALCIUM SERPL-MCNC: 8.7 MG/DL (ref 8.3–10.1)
CHLORIDE SERPL-SCNC: 102 MMOL/L (ref 100–108)
CLARITY, POC: CLEAR
CO2 SERPL-SCNC: 30 MMOL/L (ref 21–32)
COLOR, POC: YELLOW
CREAT SERPL-MCNC: 0.71 MG/DL (ref 0.6–1.3)
EOSINOPHIL # BLD AUTO: 0.02 THOUSAND/ΜL (ref 0–0.61)
EOSINOPHIL NFR BLD AUTO: 1 % (ref 0–6)
ERYTHROCYTE [DISTWIDTH] IN BLOOD BY AUTOMATED COUNT: 12.7 % (ref 11.6–15.1)
EXT BILIRUBIN, UA: NEGATIVE
EXT BLOOD URINE: NORMAL
EXT GLUCOSE, UA: NEGATIVE
EXT KETONES: NEGATIVE
EXT NITRITE, UA: NEGATIVE
EXT PH, UA: 7
EXT PREG TEST URINE: NEGATIVE
EXT PROTEIN, UA: NEGATIVE
EXT SPECIFIC GRAVITY, UA: 1
EXT UROBILINOGEN: 0.2
GFR SERPL CREATININE-BSD FRML MDRD: 110 ML/MIN/1.73SQ M
GLUCOSE SERPL-MCNC: 110 MG/DL (ref 65–140)
HCT VFR BLD AUTO: 39.9 % (ref 34.8–46.1)
HGB BLD-MCNC: 13.2 G/DL (ref 11.5–15.4)
LYMPHOCYTES # BLD AUTO: 0.66 THOUSANDS/ΜL (ref 0.6–4.47)
LYMPHOCYTES NFR BLD AUTO: 17 % (ref 14–44)
MAGNESIUM SERPL-MCNC: 2.1 MG/DL (ref 1.6–2.6)
MCH RBC QN AUTO: 29.2 PG (ref 26.8–34.3)
MCHC RBC AUTO-ENTMCNC: 33.1 G/DL (ref 31.4–37.4)
MCV RBC AUTO: 88 FL (ref 82–98)
MONOCYTES # BLD AUTO: 0.33 THOUSAND/ΜL (ref 0.17–1.22)
MONOCYTES NFR BLD AUTO: 9 % (ref 4–12)
NEUTROPHILS # BLD AUTO: 2.83 THOUSANDS/ΜL (ref 1.85–7.62)
NEUTS SEG NFR BLD AUTO: 73 % (ref 43–75)
PLATELET # BLD AUTO: 180 THOUSANDS/UL (ref 149–390)
PMV BLD AUTO: 11.4 FL (ref 8.9–12.7)
POTASSIUM SERPL-SCNC: 3.1 MMOL/L (ref 3.5–5.3)
PROT SERPL-MCNC: 7.3 G/DL (ref 6.4–8.2)
RBC # BLD AUTO: 4.52 MILLION/UL (ref 3.81–5.12)
SODIUM SERPL-SCNC: 141 MMOL/L (ref 136–145)
WBC # BLD AUTO: 3.84 THOUSAND/UL (ref 4.31–10.16)
WBC # BLD EST: NEGATIVE 10*3/UL

## 2017-12-14 PROCEDURE — 83735 ASSAY OF MAGNESIUM: CPT | Performed by: NURSE PRACTITIONER

## 2017-12-14 PROCEDURE — 36415 COLL VENOUS BLD VENIPUNCTURE: CPT | Performed by: NURSE PRACTITIONER

## 2017-12-14 PROCEDURE — 96374 THER/PROPH/DIAG INJ IV PUSH: CPT

## 2017-12-14 PROCEDURE — 99283 EMERGENCY DEPT VISIT LOW MDM: CPT

## 2017-12-14 PROCEDURE — 80053 COMPREHEN METABOLIC PANEL: CPT | Performed by: NURSE PRACTITIONER

## 2017-12-14 PROCEDURE — 81025 URINE PREGNANCY TEST: CPT | Performed by: NURSE PRACTITIONER

## 2017-12-14 PROCEDURE — 96361 HYDRATE IV INFUSION ADD-ON: CPT

## 2017-12-14 PROCEDURE — 85025 COMPLETE CBC W/AUTO DIFF WBC: CPT | Performed by: NURSE PRACTITIONER

## 2017-12-14 PROCEDURE — 96375 TX/PRO/DX INJ NEW DRUG ADDON: CPT

## 2017-12-14 PROCEDURE — 81002 URINALYSIS NONAUTO W/O SCOPE: CPT | Performed by: NURSE PRACTITIONER

## 2017-12-14 RX ORDER — RANITIDINE 150 MG/1
1 TABLET ORAL AS NEEDED
COMMUNITY
Start: 2013-12-16

## 2017-12-14 RX ORDER — POTASSIUM CHLORIDE 20 MEQ/1
40 TABLET, EXTENDED RELEASE ORAL ONCE
Status: COMPLETED | OUTPATIENT
Start: 2017-12-14 | End: 2017-12-14

## 2017-12-14 RX ORDER — ONDANSETRON 2 MG/ML
4 INJECTION INTRAMUSCULAR; INTRAVENOUS ONCE
Status: COMPLETED | OUTPATIENT
Start: 2017-12-14 | End: 2017-12-14

## 2017-12-14 RX ORDER — MORPHINE SULFATE 2 MG/ML
2 INJECTION, SOLUTION INTRAMUSCULAR; INTRAVENOUS ONCE
Status: COMPLETED | OUTPATIENT
Start: 2017-12-14 | End: 2017-12-14

## 2017-12-14 RX ORDER — FLUTICASONE PROPIONATE 50 MCG
2 SPRAY, SUSPENSION (ML) NASAL DAILY
COMMUNITY
Start: 2017-11-06 | End: 2019-02-26 | Stop reason: SDUPTHER

## 2017-12-14 RX ORDER — ONDANSETRON 4 MG/1
4 TABLET, ORALLY DISINTEGRATING ORAL EVERY 6 HOURS PRN
Qty: 20 TABLET | Refills: 0 | Status: SHIPPED | OUTPATIENT
Start: 2017-12-14 | End: 2019-04-02 | Stop reason: ALTCHOICE

## 2017-12-14 RX ADMIN — SODIUM CHLORIDE 1000 ML: 0.9 INJECTION, SOLUTION INTRAVENOUS at 16:14

## 2017-12-14 RX ADMIN — ONDANSETRON 4 MG: 2 INJECTION INTRAMUSCULAR; INTRAVENOUS at 16:10

## 2017-12-14 RX ADMIN — MORPHINE SULFATE 2 MG: 2 INJECTION, SOLUTION INTRAMUSCULAR; INTRAVENOUS at 16:10

## 2017-12-14 RX ADMIN — POTASSIUM CHLORIDE 40 MEQ: 1500 TABLET, EXTENDED RELEASE ORAL at 18:19

## 2017-12-14 NOTE — ED PROVIDER NOTES
History  Chief Complaint   Patient presents with    Vomiting     To Ed with c/o N/V/D for 2 days  Pt states that she feels dizzy and is "sure that she is dehydrated  States that she has vomited x 15 yesterday  No fever and chills  29-year-old female with a 2 day history of vomiting and diarrhea   states that she was feeling congested in her head 3 days ago so he made her onion tea  She woke the next morning with vomiting and diarrhea persistently approximately 15 times per day each  It has continued today  She feels dizzy when she stands has not been able to keep any fluids down  Complains of generalized epigastric tenderness but no focal abdominal pain  No complaint of fever or chills            Prior to Admission Medications   Prescriptions Last Dose Informant Patient Reported? Taking?   fluticasone (FLONASE) 50 mcg/act nasal spray   Yes Yes   Si sprays into each nostril daily   multivitamin (THERAGRAN) TABS   Yes No   Sig: Take 1 tablet by mouth daily  ranitidine (ZANTAC) 150 mg tablet   Yes Yes   Sig: Take 1 tablet by mouth      Facility-Administered Medications: None       Past Medical History:   Diagnosis Date    Fibromyalgia        Past Surgical History:   Procedure Laterality Date    CHOLECYSTECTOMY      TUBAL LIGATION         History reviewed  No pertinent family history  I have reviewed and agree with the history as documented  Social History   Substance Use Topics    Smoking status: Never Smoker    Smokeless tobacco: Never Used    Alcohol use No        Review of Systems   Gastrointestinal: Positive for abdominal pain, diarrhea and vomiting  Genitourinary: Negative for flank pain  Neurological: Positive for dizziness  Negative for syncope         Physical Exam  ED Triage Vitals [17 1529]   Temperature Pulse Respirations Blood Pressure SpO2   (!) 97 2 °F (36 2 °C) 78 20 128/80 96 %      Temp Source Heart Rate Source Patient Position - Orthostatic VS BP Location FiO2 (%)   Temporal Monitor Sitting Right arm --      Pain Score       4           Orthostatic Vital Signs  Vitals:    12/14/17 1715 12/14/17 1730 12/14/17 1745 12/14/17 1800   BP: 104/60 103/61 106/66 103/62   Pulse: 75 76 83 78   Patient Position - Orthostatic VS: Lying Lying Lying Lying       Physical Exam   Constitutional: She is oriented to person, place, and time  She appears well-developed and well-nourished  HENT:   Head: Normocephalic and atraumatic  Eyes: EOM are normal  Pupils are equal, round, and reactive to light  No scleral icterus  Neck: Normal range of motion  Neck supple  No thyromegaly present  Cardiovascular: Normal rate and regular rhythm  Pulmonary/Chest: Effort normal    Abdominal: Soft  Bowel sounds are normal        Musculoskeletal: Normal range of motion  She exhibits no edema or tenderness  Neurological: She is alert and oriented to person, place, and time  Skin: Skin is warm and dry  Nursing note and vitals reviewed        ED Medications  Medications   potassium chloride (K-DUR,KLOR-CON) CR tablet 40 mEq (not administered)   sodium chloride 0 9 % bolus 1,000 mL (0 mL Intravenous Stopped 12/14/17 1742)   ondansetron (ZOFRAN) injection 4 mg (4 mg Intravenous Given 12/14/17 1610)   morphine injection 2 mg (2 mg Intravenous Given 12/14/17 1610)       Diagnostic Studies  Results Reviewed     Procedure Component Value Units Date/Time    Comprehensive metabolic panel [[id number]]  (Abnormal) Collected:  12/14/17 1614    Lab Status:  Final result Specimen:  Blood from Arm, Right Updated:  12/14/17 1712     Sodium 141 mmol/L      Potassium 3 1 (L) mmol/L      Chloride 102 mmol/L      CO2 30 mmol/L      Anion Gap 9 mmol/L      BUN 6 mg/dL      Creatinine 0 71 mg/dL      Glucose 110 mg/dL      Calcium 8 7 mg/dL       (H) U/L       (H) U/L      Alkaline Phosphatase 103 U/L      Total Protein 7 3 g/dL      Albumin 4 0 g/dL      Total Bilirubin 1 00 mg/dL      eGFR 110 ml/min/1 73sq m     Narrative:         National Kidney Disease Education Program recommendations are as follows:  GFR calculation is accurate only with a steady state creatinine  Chronic Kidney disease less than 60 ml/min/1 73 sq  meters  Kidney failure less than 15 ml/min/1 73 sq  meters      Magnesium [[id number]]  (Normal) Collected:  12/14/17 1614    Lab Status:  Final result Specimen:  Blood from Arm, Right Updated:  12/14/17 1712     Magnesium 2 1 mg/dL     POCT urinalysis dipstick [[id number]]  (Normal) Resulted:  12/14/17 1700    Lab Status:  Final result Specimen:  Urine Updated:  12/14/17 1701     Color, UA yellow     Clarity, UA clear     EXT Glucose, UA negative     EXT Bilirubin, UA (Ref: Negative) negative     EXT Ketones, UA (Ref: Negative) negative     EXT Spec Grav, UA 1 000     EXT Blood, UA (Ref: Negative) trace     EXT pH, UA 7 0     EXT Protein, UA (Ref: Negative) negative     EXT Urobilinogen, UA (Ref: 0 2- 1 0) 0 2     EXT Leukocytes, UA (Ref: Negative) negative     EXT Nitrite, UA (Ref: Negative) negative    POCT pregnancy, urine [[id number]]  (Normal) Resulted:  12/14/17 1659    Lab Status:  Final result Updated:  12/14/17 1700     EXT PREG TEST UR (Ref: Negative) negative    CBC and differential [[id number]]  (Abnormal) Collected:  12/14/17 1614    Lab Status:  Final result Specimen:  Blood from Arm, Right Updated:  12/14/17 1655     WBC 3 84 (L) Thousand/uL      RBC 4 52 Million/uL      Hemoglobin 13 2 g/dL      Hematocrit 39 9 %      MCV 88 fL      MCH 29 2 pg      MCHC 33 1 g/dL      RDW 12 7 %      MPV 11 4 fL      Platelets 968 Thousands/uL      Neutrophils Relative 73 %      Lymphocytes Relative 17 %      Monocytes Relative 9 %      Eosinophils Relative 1 %      Basophils Relative 0 %      Neutrophils Absolute 2 83 Thousands/µL      Lymphocytes Absolute 0 66 Thousands/µL      Monocytes Absolute 0 33 Thousand/µL      Eosinophils Absolute 0 02 Thousand/µL      Basophils Absolute 0 00 Thousands/µL                  No orders to display              Procedures  Procedures       Phone Contacts  ED Phone Contact    ED Course  ED Course as of Dec 14 1812   u Dec 14, 2017   1811 Reassessed pt, she is feeling much better, labs wnl except for potassium  Will replace, d/c home on zofran odt                                MDM  Number of Diagnoses or Management Options  Gastroenteritis: new and requires workup  Diagnosis management comments: Labs normal  Physical exam no acute pain  Feels better after iVF and zofran  D/c to home gastroent       Amount and/or Complexity of Data Reviewed  Clinical lab tests: ordered and reviewed  Tests in the radiology section of CPT®: ordered and reviewed  Tests in the medicine section of CPT®: ordered and reviewed  Independent visualization of images, tracings, or specimens: yes    Patient Progress  Patient progress: improved    CritCare Time    Disposition  Final diagnoses:   Gastroenteritis     Time reflects when diagnosis was documented in both MDM as applicable and the Disposition within this note     Time User Action Codes Description Comment    12/14/2017  6:05 PM Cammie Castillo Add [K52 9] Gastroenteritis       ED Disposition     ED Disposition Condition Comment    Discharge  [facility] discharge to home/self care  Condition at discharge: Good        Follow-up Information     Follow up With Specialties Details Why Contact Info    Jessy Carmen MD Family Medicine  As needed [address]  [address] [phone]          Patient's Medications   Discharge Prescriptions    ONDANSETRON (ZOFRAN-ODT) 4 MG DISINTEGRATING TABLET    Take 1 tablet by mouth every 6 (six) hours as needed for nausea or vomiting       Start Date: 12/14/2017End Date: --       Order Dose: 4 mg       Quantity: 20 tablet    Refills: 0     No discharge procedures on file      ED Provider  Electronically Signed by           PARKER Eubanks  12/14/17 6040

## 2017-12-14 NOTE — DISCHARGE INSTRUCTIONS
Enteritis   CUIDADO AMBULATORIO:   Enteritis  es la inflamación del intestino vargas  Podría ser provocada por comer alimentos o liz líquidos contaminados con un virus, bacteria o parásitos  También podría ser a causa de ciertos medicamentos, por daño proveniente de la radiación y por condiciones médicas torin la enfermedad de [place]  Los signos y síntomas más comunes incluyen los siguientes:   · Diarrea    · Gail o mucosidad en richar movimientos intestinales    · Náuseas y vómitos    · [place] o escalofríos    · Dolor abdominal  Busque atención médica de inmediato si:   · Usted no puede dejar de vomitar  · Usted no ha orinado en 12 horas  Pregúntele a blackman [place] vitaminas y minerales son adecuados para usted  · Usted tiene fiebre más nicola de 101 5  · Usted tiene gail o mucosidad en richar movimientos intestinales  · Usted continúa vomitando o tiene diarrea por más de 3 días, incluso después del tratamiento  · Usted tiene la boca y ojos resecos, está orinando menos de lo normal y se siente mareado cuando se pone de pie  · Blackman boca u ojos están secos  Usted no está orinando tanto o con la misma frecuencia  · Usted pierde peso sin proponérselo  · Usted tiene preguntas o inquietudes acerca de blackman condición o cuidado  El tratamiento para la enteritis  depende de la causa  La enteritis podría mejorar por sí kamlesh, o es posible que usted necesite alguno de los siguientes:  · [place],  [place] administrarle medicamentospara combatir marcelo infección provocada por marcelo bacteria o un parásito  Es posible que también necesite medicamento para reducir o detener la diarrea y los vómitos  No tome estos medicamentos a menos que blackman médico se lo autorice  Podrían ser necesarios otros medicamentos para tratar las condiciones médicas que están provocando la enteritis  · Consuma alimentos que le ayuden a disminuir richar síntomas    Limite o evite los alimentos y líquidos que son altos en azúcar, Chavo Jenise y fibra para ayudar a aliviar la diarrea  Podría ayudar que evite la lactosa  La lactosa es un tipo de azúcar que se encuentra en los productos lácteos  Es posible que usted [employer] sopas, caldos, verduras lamonte cocidas, frutas enlatadas y joana horneadas o asadas  Pregunte a blackman dietista o médico si usted debería seguir marcelo dieta especial  Es posible que necesite evitar otros alimentos si tiene ciertas condiciones médicas torin la enfermedad celíaca  · [address] se le haya indicado  Pregunte cuánto líquido debe liz cada día y cuáles líquidos son los más adecuados para usted  Es importante evitar o tratar la deshidratación  Incluso si usted ha estado vomitando, chupe hielo triturado o tome tragos pequeños de líquidos essie con frecuencia  Poco a poco, aumente la cantidad de líquidos essie que usted tome  Si se deshidrata, es probable que necesite líquidos por vía intravenosa  · [place] marcelo solución de rehidratación oral (SRO) torin se le indique  Esta solución contiene agua, sales y azúcares necesarios para reemplazar los líquidos corporales perdidos  Pregunte qué tipo de solución de rehidratación oral debe usar, qué cantidad debe liz y dónde puede obtenerla  Evite la enteritis:  La enteritis que es provocada por marcelo bacteria, parásitos o virus puede evitarse  Lo siguiente podría ayudar a evitar angel tipo de enteritis:  · Lávese las champ frecuentemente  Utilice agua y [place]  [employer] las champ después de usar el baño, cambiarle el pañal a un niall o estornudar  Lávese las champ antes de comer o preparar alimentos  · Limpie las superficies y lave la ropa con frecuencia  Lave blackman ropa y richar toallas por separado del neena de la ropa  Limpie las superficies de blackman hogar con limpiador antibacterial o con blanqueador  · Lave y cocine lamonte los alimentos  Lave las verduras crudas antes de cocinar  [address] y [place]   No utilice los mismos platos para las joana crudas que para otros alimentos  Ponga en el refrigerador inmediatamente cualquier alimento que haya sobrado  · Esté alerta cuando usted vaya de campamento o cuando viaje  Solamente tome agua limpia  No tome agua de los lydia o franklyn a menos que usted purifique o hierva el agua tony  Cuando viaje, tome agua embotellada y no le ponga hielo  No coma fruta con la cáscara  No coma pescado crudo o joana que no están cocinadas completamente  © 2017 [address] Information is for End User's use only and may not be sold, redistributed or otherwise used for commercial purposes  All illustrations and images included in CareNotes® are the copyrighted property of A D A M , Inc  or Santino Booth  Esta información es sólo para uso en educación  [place] intención no es darle un consejo médico sobre enfermedades o tratamientos  Colsulte con carrasco Bary Rashawn farmacéutico antes de seguir cualquier régimen médico para saber si es seguro y efectivo para usted

## 2017-12-15 ENCOUNTER — GENERIC CONVERSION - ENCOUNTER (OUTPATIENT)
Dept: OTHER | Facility: OTHER | Age: 37
End: 2017-12-15

## 2017-12-18 ENCOUNTER — GENERIC CONVERSION - ENCOUNTER (OUTPATIENT)
Dept: OTHER | Facility: OTHER | Age: 37
End: 2017-12-18

## 2018-01-11 NOTE — PROGRESS NOTES
Assessment    1  Dental disorder (525 9) (K08 9)    Plan  Dental disorder    · Clindamycin HCl - 300 MG Oral Capsule; TAKE 1 CAPSULE EVERY 6 HOURS  DAILY   · Triamcinolone Acetonide 0 1 % Mouth/Throat Paste; apply to affected area 3 times  a day    Discussion/Summary    Pain consistent with a dental disorder  concern for developing tooth abscess  will treat with clindamycin  advised another opinion with a different dentist as may need intervention to fully treat  Chief Complaint  Patient is here today with c/o sore feeling jaw/ mouth area  History of Present Illness  HPI: pain of the mouth  Along side the gum line right at the upper molar  Was seen by dental medicine a few days ago  Reports they do not think there was any dental problem  She did note that she hurt her gum a few days before it started hurting  She took some left over amoxil which has not helped  no sinus pressure, no fever, no chills  feels her face is somewhat swollen  Review of Systems    Constitutional: not feeling poorly and not feeling tired  ENT: no nosebleeds and no nasal discharge  Cardiovascular: no chest pain and no palpitations  Respiratory: no cough and no shortness of breath during exertion  Active Problems    1  Abdominal pain (789 00) (R10 9)   2  Chronic cholecystitis (575 11) (K81 1)   3  Chronic constipation (564 00) (K59 00)   4  Encounter for routine pelvic examination (V72 31) (Z01 419)   5  Esophagitis, reflux (530 11) (K21 0)   6  Fibromyalgia (729 1) (M79 7)   7  Flu vaccine need (V04 81) (Z23)   8  Headache (784 0) (R51)   9  Hypoglycemia (251 2) (E16 2)   10  Neck muscle spasm (728 85) (M62 838)   11  Right facial numbness (782 0) (R20 0)   12  Tension headache (307 81) (G44 209)   13  UTI (urinary tract infection) (599 0) (N39 0)   14  UTI symptoms (788 99) (R39 9)    Past Medical History    1  Atypical chest pain (786 59) (R07 89)   2   History of Carpal tunnel syndrome, unspecified laterality (354 0) (G56 00)   3  Chronic constipation (564 00) (K59 00)   4  History of Contact dermatitis due to poison ivy (692 6) (L23 7)   5  Dental disorder (525 9) (K08 9)   6  History of Dysuria (788 1) (R30 0)   7  History of cholelithiasis (V12 79) (Z87 19)   8  History of dizziness (V13 89) (Z87 898)   9  History of headache (V13 89) (Z87 898)   10  History of hypoglycemia (V12 29) (Z86 39)   11  History of nausea and vomiting (V12 79) (Z87 898)   12  History of urinary frequency (V13 09) (Z87 898)   13  Neck muscle spasm (728 85) (M62 838)   14  Right facial numbness (782 0) (R20 0)   15  History of Shoulder pain, right (719 41) (M25 511)   16  Tension headache (307 81) (G44 209)   17  History of Umbilical hernia (223 3) (K42 9)   18  UTI (urinary tract infection) (599 0) (N39 0)    Family History    1  Family history of Anemia (V18 2)   2  Family history of Hypertension (V17 49)   3  Family history of Type 2 Diabetes Mellitus    4  Family history of Type 2 Diabetes Mellitus    5  Family history of Arthritis (V17 7)   6  Family history of Coronary Artery Disease (V17 49)   7  Family history of Diabetes Mellitus (V18 0)   8  Family history of Hyperlipidemia   9  Family history of Osteoporosis (V17 81)    Social History    · Caffeine Use   · Never A Smoker   · No drug use   · Support At Home For Breast-feeding    Surgical History    1  History of Cholecystectomy Laparoscopic   2  History of Tubal Ligation   3  History of Umbilical Hernia Repair    Current Meds   1  Fluticasone Propionate 50 MCG/ACT Nasal Suspension; instill 2 sprays into each nostril   once daily; Therapy: 74MHM9957 to 0496 27 16 26)  Requested for: 21GGL4014; Last   Rx:23Jun2015 Ordered   2  Multiple Vitamins Oral Tablet Recorded   3  Polyethylene Glycol 3350 Oral Packet; PACK PO;   Therapy: 09KWJ9773 to (Last Rx:09Nov2015)  Requested for: 76EJF7071 Ordered   4   Ranitidine HCl - 150 MG Oral Tablet; TAKE 1 TABLET AT BEDTIME; Therapy: 04WJM2863 to (Evaluate:21Oct2015); Last Rx:23Jun2015 Ordered   5  SMZ-TMP -160 MG Oral Tablet; TAKE 1 TABLET TWICE DAILY UNTIL FINISHED; Therapy: 01XPM5908 to 0699 273 53 89)  Requested for: 02HDC6330; Last   Rx:28Oct2015 Ordered    Allergies    1  No Known Drug Allergies    Vitals   Recorded: 88KEC0983 03:07PM   Temperature 98 6 F, Tympanic   Heart Rate 76, L Radial   Pulse Quality Norm   Respiration 12   Respiration Quality Norm   Systolic 494, LUE, Sitting   Diastolic 64, LUE, Sitting   Weight 171 lb 2 08 oz     Physical Exam    Constitutional   General appearance: No acute distress, well appearing and well nourished  Eyes   Conjunctiva and lids: No swelling, erythema or discharge  Ears, Nose, Mouth, and Throat   Oropharynx: Abnormal   upper molar, tender to touch, gum line painful to touch, no swelling, discharge , no erythema seen          Signatures   Electronically signed by : Kev De Paz MD; Jan 28 2016  3:33PM EST                       (Author)

## 2018-01-13 VITALS
DIASTOLIC BLOOD PRESSURE: 76 MMHG | TEMPERATURE: 98 F | WEIGHT: 178.2 LBS | HEIGHT: 64 IN | BODY MASS INDEX: 30.42 KG/M2 | HEART RATE: 72 BPM | SYSTOLIC BLOOD PRESSURE: 140 MMHG

## 2018-01-13 VITALS
HEIGHT: 64 IN | WEIGHT: 176.6 LBS | HEART RATE: 76 BPM | BODY MASS INDEX: 30.15 KG/M2 | SYSTOLIC BLOOD PRESSURE: 118 MMHG | TEMPERATURE: 99.1 F | DIASTOLIC BLOOD PRESSURE: 80 MMHG

## 2018-01-13 VITALS
WEIGHT: 178.8 LBS | BODY MASS INDEX: 30.52 KG/M2 | TEMPERATURE: 97.8 F | HEIGHT: 64 IN | DIASTOLIC BLOOD PRESSURE: 70 MMHG | SYSTOLIC BLOOD PRESSURE: 108 MMHG | HEART RATE: 60 BPM

## 2018-01-13 NOTE — MISCELLANEOUS
Message   Recorded as Task   Date: 01/29/2016 09:44 AM, Created By: Hugo Javier   Task Name: Medical Complaint Callback   Assigned To: FRANCINE [facility],Team   Regarding Patient: Libby Mendez, Status: Active   Comment:    Lyn Ugalde - 29 Jan 2016 9:44 AM     TASK CREATED  Caller: Self; Medical Complaint; [phone] (Home)  Started with stomach discomfort  No diarrhea  She did eat with the antibiotic  She thinks shes allergic to it  What should she do? No other symptoms  Alexandru Phillip - 29 Jan 2016 11:25 AM     TASK REASSIGNED: Previously Assigned To Alexandru Phillip  likely the clindamycin  to try this again but to take with food  Lyn Ugalde - 29 Jan 2016 12:32 PM     TASK EDITED   aware        Active Problems    1  Abdominal pain (789 00) (R10 9)   2  Chronic cholecystitis (575 11) (K81 1)   3  Chronic constipation (564 00) (K59 00)   4  Dental disorder (525 9) (K08 9)   5  Encounter for routine pelvic examination (V72 31) (Z01 419)   6  Esophagitis, reflux (530 11) (K21 0)   7  Fibromyalgia (729 1) (M79 7)   8  Flu vaccine need (V04 81) (Z23)   9  Headache (784 0) (R51)   10  Hypoglycemia (251 2) (E16 2)   11  Neck muscle spasm (728 85) (M62 838)   12  Right facial numbness (782 0) (R20 0)   13  Tension headache (307 81) (G44 209)   14  UTI (urinary tract infection) (599 0) (N39 0)   15  UTI symptoms (788 99) (R39 9)    Current Meds   1  Clindamycin HCl - 300 MG Oral Capsule; TAKE 1 CAPSULE EVERY 6 HOURS DAILY; Therapy: 61IBP2708 to (Complete:04Agz3823)  Requested for: 23RDH3144; Last   Rx:28Jan2016 Ordered   2  Fluticasone Propionate 50 MCG/ACT Nasal Suspension; instill 2 sprays into each nostril   once daily; Therapy: 35HFC6387 to 798 660 361)  Requested for: 28VYZ6527; Last   Rx:23Jun2015 Ordered   3  Multiple Vitamins Oral Tablet Recorded   4   Polyethylene Glycol 3350 Oral Packet; PACK PO;   Therapy: 64PPZ0692 to (Last KB:89WSI0910)  Requested for: 44JFN5561 Ordered   5  Ranitidine HCl - 150 MG Oral Tablet; TAKE 1 TABLET AT BEDTIME; Therapy: 48XUP3065 to (Evaluate:21Oct2015); Last PP:61VLC8976 Ordered   6  SMZ-TMP -160 MG Oral Tablet; TAKE 1 TABLET TWICE DAILY UNTIL FINISHED; Therapy: 64POA8331 to 21 )  Requested for: 15AHH1842; Last   Rx:28Oct2015 Ordered   7  Triamcinolone Acetonide 0 1 % Mouth/Throat Paste; apply to affected area 3 times a day; Therapy: 28RYE1334 to (Evaluate:73Afg6464)  Requested for: 21CZO3799; Last   Rx:28Jan2016 Ordered    Allergies    1   No Known Drug Allergies    Signatures   Electronically signed by : Prashant Williamson MD; Feb 1 2016  7:34AM EST                       (Author)

## 2018-01-14 VITALS
HEIGHT: 64 IN | DIASTOLIC BLOOD PRESSURE: 72 MMHG | WEIGHT: 180.2 LBS | TEMPERATURE: 97.9 F | SYSTOLIC BLOOD PRESSURE: 114 MMHG | HEART RATE: 80 BPM | BODY MASS INDEX: 30.77 KG/M2

## 2018-01-14 NOTE — RESULT NOTES
Verified Results  * CT ABDOMEN PELVIS W CONTRAST [id number] 09:50AM Jose R Lin Order Number: [id number]    - Patient Instructions: To schedule this appointment, please contact Central Scheduling at [phone]  Test Name Result Flag Reference   CT ABDOMEN PELVIS W CONTRAST (Report)     CT ABDOMEN AND PELVIS WITH IV CONTRAST     INDICATION: Right upper quadrant pain  COMPARISON: Ultrasound 11/19/2016  TECHNIQUE: CT examination of the abdomen and pelvis  Axial, sagittal and coronal reformatted projections were created  This examination, like all CT scans performed in the [facility], was performed utilizing techniques to    minimize radiation dose exposure, including the use of iterative reconstruction and automated exposure control  IV Contrast: iohexol (OMNIPAQUE) 350 MG/ML injection (MULTI-DOSE) 100 mL Note: (SINGLE DOSE/MULTI DOSE) information refers to the container from which the contrast was acquired  Contrast was injected one time intravenously without immediate    complication  Enteric Contrast: Enteric contrast was not administered  FINDINGS:     ABDOMEN     LOWER CHEST: No significant abnormalities identified in the lower chest      LIVER/BILIARY TREE: Unremarkable  GALLBLADDER: Status post cholecystectomy  SPLEEN: Unremarkable  PANCREAS: Unremarkable  ADRENAL GLANDS: Unremarkable  KIDNEYS/URETERS: Unremarkable  No hydronephrosis  STOMACH AND BOWEL: Unremarkable  APPENDIX: No findings to suggest appendicitis  ABDOMINOPELVIC CAVITY: No ascites or free intraperitoneal air  No lymphadenopathy  VESSELS: Unremarkable for patient's age  PELVIS     REPRODUCTIVE ORGANS: Unremarkable for patient's age  URINARY BLADDER: Unremarkable  ABDOMINAL WALL/INGUINAL REGIONS: Unremarkable  OSSEOUS STRUCTURES: No acute fracture or destructive osseous lesion         IMPRESSION:     No etiology for abdominal pain identified  Workstation performed: [id number]     Signed by: Rajendra Hughes MD   1/9/17       Discussion/Summary    please call   Ct scan of the abdomen and pelvis is normal    if she continues with abdominal pain I do recommend seeing Gastrenterology, [facility] Gastro- Dr Gely Chen ( has [facility] insurance)    Braulio Daniels informed pt

## 2018-01-17 NOTE — RESULT NOTES
Discussion/Summary   please call     labs are good  normal lipids, normal blood sugars, normal thyroid studies     Verified Results  (1) LIPID PANEL, FASTING 21Apr2017 11:27AM Alexandru Phillip     Test Name Result Flag Reference   CHOLESTEROL, TOTAL 134 mg/dL  125-200   HDL CHOLESTEROL 50 mg/dL  > OR = 46   TRIGLICERIDES 80 mg/dL  <901   LDL-CHOLESTEROL 68 mg/dL (calc)  <130   Desirable range <100 mg/dL for patients with CHD or  diabetes and <70 mg/dL for diabetic patients with  known heart disease  CHOL/HDLC RATIO 2 7 (calc)  < OR = 5 0   NON HDL CHOLESTEROL 84 mg/dL (calc)     Target for non-HDL cholesterol is 30 mg/dL higher than   LDL cholesterol target  (1) COMPREHENSIVE METABOLIC PANEL [id number] 41:21PD Alexandru Phillip     Test Name Result Flag Reference   GLUCOSE 97 mg/dL  65-99   Fasting reference interval   UREA NITROGEN (BUN) 9 mg/dL  7-25   CREATININE 0 69 mg/dL  0 50-1 10   eGFR NON-AFR   AMERICAN 113 mL/min/1 73m2  > OR = 60   eGFR AFRICAN AMERICAN 131 mL/min/1 73m2  > OR = 60   BUN/CREATININE RATIO   3-22   NOT APPLICABLE (calc)   SODIUM 137 mmol/L  135-146   POTASSIUM 3 8 mmol/L  3 5-5 3   CHLORIDE 103 mmol/L     CARBON DIOXIDE 29 mmol/L  20-31   CALCIUM 9 0 mg/dL  8 6-10 2   PROTEIN, TOTAL 6 5 g/dL  6 1-8 1   ALBUMIN 4 1 g/dL  3 6-5 1   GLOBULIN 2 4 g/dL (calc)  1 9-3 7   ALBUMIN/GLOBULIN RATIO 1 7 (calc)  1 0-2 5   BILIRUBIN, TOTAL 1 0 mg/dL  0 2-1 2   ALKALINE PHOSPHATASE 57 U/L     AST 20 U/L  10-30   ALT 20 U/L  6-29     (Q) TSH, 3RD GENERATION W/REFLEX TO FT4 21Apr2017 11:27AM Alexandru Phillip     Test Name Result Flag Reference   TSH W/REFLEX TO FT4 0 94 mIU/L     Reference Range                         > or = 20 Years  0 40-4 50                              Pregnancy Ranges            First trimester    0 26-2 66            Second trimester   0 55-2 73            Third trimester    0 43-2 91     (Q) HEMOGLOBIN A1c 21Apr2017 11:27AM Alexandru Phillip   REPORT COMMENT:  FASTING:YES     Test Name Result Flag Reference   HEMOGLOBIN A1c 5 6 % of total Hgb  <5 7   For the purpose of screening for the presence of  diabetes:     <5 7%       Consistent with the absence of diabetes  5 7-6 4%    Consistent with increased risk for diabetes              (prediabetes)  > or =6 5%  Consistent with diabetes     This assay result is consistent with a decreased risk  of diabetes  Currently, no consensus exists regarding use of  hemoglobin A1c for diagnosis of diabetes in children  According to American Diabetes Association (ADA)  guidelines, hemoglobin A1c <7 0% represents optimal  control in non-pregnant diabetic patients  Different  metrics may apply to specific patient populations  Standards of Medical Care in Diabetes(ADA)

## 2018-01-23 NOTE — MISCELLANEOUS
Message   Recorded as Task   Date: 12/18/2017 08:31 AM, Created By: Alexandru Phillip   Task Name: Follow Up   Assigned To: [address] [place] [address]   Regarding Patient: Ulysses Leon, Status: Active   Comment:    Alexandru Phillip - 18 Dec 2017 8:31 AM     TASK CREATED  please call patient  MRI results are normal    Анна Laurenby - 18 Dec 2017 8:51 AM     TASK EDITED  Patients  notified of results   Jeane Lauren - 18 Dec 2017 8:51 AM     TASK COMPLETED   Jeane Lauren - 18 Dec 2017 8:51 AM     TASK REACTIVATED   Jeane Lauren - 18 Dec 2017 8:52 AM     TASK REPLIED TO: Previously Assigned To [address] [place] [address]  Patient is still very congested - sinus pressure and face swollen  Should she come in again or can we send another round of abx? Alexandru Phillip - 18 Dec 2017 8:59 AM     TASK REASSIGNED: Previously Assigned To Alexandru Phillip  I will send levaquin 500 mg daily for 10 days  continue with flonase nasal spray nightly   TremigdaliaJeane - 18 Dec 2017 9:11 AM     TASK EDITED  Patient  notified of results        Active Problems    1  History of Abdominal pain, right upper quadrant (789 01) (R10 11)   2  Acute bacterial sinusitis (461 9) (J01 90,B96 89)   3  Acute gastroenteritis (558 9) (K52 9)   4  History of Bilateral lower abdominal discomfort (789 03,789 04) (R10 31,R10 32)   5  Chronic cholecystitis (575 11) (K81 1)   6  Chronic constipation (564 00) (K59 09)   7  Dental disorder (525 9) (K08 9)   8  Encounter for routine pelvic examination (V72 31) (Z01 419)   9  Esophagitis, reflux (530 11) (K21 0)   10  Fibromyalgia (729 1) (M79 7)   11  Flu vaccine need (V04 81) (Z23)   12  Headache (784 0) (R51)   13  History of sore throat (V12 60) (Z87 09)   14  Hypoglycemia (251 2) (E16 2)   15  Impaired fasting glucose (790 21) (R73 01)   16  History of Neck muscle spasm (728 85) (M62 838)   17  Numbness and tingling in left upper extremity (782 0) (R20 0,R20 2)   18  Numbness and tingling of left side of face (782 0) (R20 0,R20 2)   19  Numbness of left lower extremity (782 0) (R20 0)   20  History of Rib pain on right side (786 50) (R07 81)   21  History of Right facial numbness (782 0) (R20 0)   22  Tension headache (307 81) (G44 209)   23  Tingling (782 0) (R20 2)    Current Meds   1  Leslie Contour Test In [employer]; USE 1 STRIP TWICE DAILY; Therapy: 59QMS2322 to (Melia Mott)  Requested for: 23WGL1087; Last   VM:31WBQ7944 Ordered   2  LevoFLOXacin 500 MG Oral Tablet (Levaquin); Take 1 tablet daily; Therapy: 73AZD0944 to (Complete:36Tew7986)  Requested for: 25TYH2007; Last   Rx:92Tvw8338 Ordered   3  Multiple Vitamins Oral Tablet Recorded   4  Ondansetron HCl - 4 MG Oral Tablet (Zofran); 1 tab by mouth every 6 hours; Therapy: 02EMQ7600 to (Last Rx:55Gnx5535)  Requested for: 52TIF7153 Ordered   5  Polyethylene Glycol 3350 Oral Packet; PACK PO;   Therapy: 04GHO3514 to (Last Rx:09Nov2015)  Requested for: 53MSS0164 Ordered   6  RaNITidine HCl - 150 MG Oral Tablet; TAKE 1 TABLET AT BEDTIME; Therapy: 78OMN6741 to (Evaluate:21Oct2015); Last Rx:23Jun2015 Ordered    Allergies    1   No Known Drug Allergies    Signatures   Electronically signed by : Suze Perez, ; Dec 18 2017  9:11AM EST                       (Author)

## 2018-01-23 NOTE — MISCELLANEOUS
Message   Recorded as Task   Date: 12/14/2017 12:10 PM, Created By: Abbey Clay   Task Name: Medical Complaint Callback   Assigned To: [address] [place] [address]   Regarding Patient: Ines George, Status: Active   Comment:    Nicole Rojas - 14 Dec 2017 12:10 PM     TASK CREATED  has been vomiting and diarrhea for 2 days  blood sugar has also been staying low, BP low also  please advise what to do  [phone]   Jeane Lauren - 14 Dec 2017 12:57 PM     TASK REASSIGNED: Previously Assigned To [address] [place] [address]  Proceed with MRI? Anything else? Alexandru Phillip - 14 Dec 2017 1:20 PM     TASK REASSIGNED: Previously Assigned To Alexandru Phillip  can use zofran    i will rx this for vomiting/nausea  make sure she has plenty of fluids ( water, gatorade, pedialyte)  usually viral  if Bp is too low, < 90 and dehydrated  I would recommend going to ER for IV fluids  Jeane Lauren - 14 Dec 2017 1:26 PM     TASK EDITED   aware  Active Problems    1  History of Abdominal pain, right upper quadrant (789 01) (R10 11)   2  Acute bacterial sinusitis (461 9) (J01 90,B96 89)   3  Acute gastroenteritis (558 9) (K52 9)   4  History of Bilateral lower abdominal discomfort (789 03,789 04) (R10 31,R10 32)   5  Chronic cholecystitis (575 11) (K81 1)   6  Chronic constipation (564 00) (K59 09)   7  Dental disorder (525 9) (K08 9)   8  Encounter for routine pelvic examination (V72 31) (Z01 419)   9  Esophagitis, reflux (530 11) (K21 0)   10  Fibromyalgia (729 1) (M79 7)   11  Flu vaccine need (V04 81) (Z23)   12  Headache (784 0) (R51)   13  History of sore throat (V12 60) (Z87 09)   14  Hypoglycemia (251 2) (E16 2)   15  Impaired fasting glucose (790 21) (R73 01)   16  History of Neck muscle spasm (728 85) (M62 838)   17  Numbness and tingling in left upper extremity (782 0) (R20 0,R20 2)   18  Numbness and tingling of left side of face (782 0) (R20 0,R20 2)   19   Numbness of left lower extremity (782 0) (R20 0)   20  History of Rib pain on right side (786 50) (R07 81)   21  History of Right facial numbness (782 0) (R20 0)   22  Tension headache (307 81) (G44 209)   23  Tingling (782 0) (R20 2)    Current Meds   1  Leslie Contour Test In [employer]; USE 1 STRIP TWICE DAILY; Therapy: 40JHF3938 to ([phone])  Requested for: 14DBE2615; Last   ER:87PSL7999 Ordered   2  Multiple Vitamins Oral Tablet Recorded   3  Ondansetron HCl - 4 MG Oral Tablet (Zofran); 1 tab by mouth every 6 hours; Therapy: 28ZAY4628 to (Last Rx:18Hen4027)  Requested for: 80ZYT6189 Ordered   4  Polyethylene Glycol 3350 Oral Packet; PACK PO;   Therapy: 23OZF3220 to (Last Rx:09Nov2015)  Requested for: 64FYT0823 Ordered   5  RaNITidine HCl - 150 MG Oral Tablet; TAKE 1 TABLET AT BEDTIME; Therapy: 06SYD7447 to (Evaluate:21Oct2015); Last Rx:23Jun2015 Ordered    Allergies    1   No Known Drug Allergies    Signatures   Electronically signed by : Talmage Schlatter, ; Dec 14 2017  1:26PM EST                       (Author)

## 2018-01-23 NOTE — PROGRESS NOTES
History of Present Illness  ED Outreach:   ED Visit Information  ED visit date: 12/14/2017  Diagnosis description: Gastroenteritis   Facility name: [facility]  Discharge status: Home  ED severity: 3  In network  Outreach Information  Outreach unsuccessful  Number of attempts - letter sent  Date letter mailed: 12/26/2017  Date finalized: 12/26/2017  Care Coordination  Emergent necessity warranted by diagnosis  [facility] PCP  Called PCP first  Was told to go to ED by PCP  Same day or next day appointment was not offered  Practice did not contact patient  No follow up appointment with PCP  Patient went to ED instead of UC or PCP - PCP instructions  Comments:   335pm 12/15/2017 LM /1041am 12/18/17 LM /[id number] 12/19/17 tonio Miguel       Signatures   Electronically signed by : Mary Cobos, ; Dec 26 2017  9:16AM EST                       (Author)

## 2018-01-24 VITALS
WEIGHT: 177.8 LBS | RESPIRATION RATE: 16 BRPM | HEIGHT: 64 IN | DIASTOLIC BLOOD PRESSURE: 62 MMHG | BODY MASS INDEX: 30.35 KG/M2 | TEMPERATURE: 98.8 F | SYSTOLIC BLOOD PRESSURE: 130 MMHG | HEART RATE: 84 BPM

## 2018-02-28 NOTE — RESULT NOTES
Discussion/Summary    please call    labs are normal  her b12 is low normal and occasional may cause some of her sytmpoms  advise b12 1000 mcg once a day  advise neurology visit as discussed  awaiting MRI of the brain  PT AWARE         Verified Results  (1) TSH WITH FT4 REFLEX [id number] 11:37AM Alexandru Phillip     Test Name Result Flag Reference   TSH 1 120 uIU/mL  0 450-4 500     (1) C-REACTIVE PROTEIN [id number] 11:37AM Alexandru Phillip     Test Name Result Flag Reference   C-Reactive Protein, Quant 1 5 mg/L  0 0-4 9     (1) VITAMIN B12 [id number] 11:37AM Alexandru Phillip     Test Name Result Flag Reference   Vitamin B12 433 pg/mL  232-1245   **Please note reference interval change**     (1) CBC/PLT/DIFF [id number] 11:37AM Alexandru Phillip     Test Name Result Flag Reference   WBC 5 2 x10E3/uL  3 4-10 8   RBC 4 21 x10E6/uL  3 77-5 28   Hemoglobin 12 4 g/dL  11 1-15 9   **Please note reference interval change**   Hematocrit 37 6 %  34 0-46  6   MCV 89 fL  79-97   MCH 29 5 pg  26 6-33 0   MCHC 33 0 g/dL  31 5-35 7   RDW 13 4 %  12 3-15 4   Platelets 440 R24E5/DB  150-379   Neutrophils 64 %  Not Estab  Lymphs 29 %  Not Estab  Monocytes 5 %  Not Estab  Eos 2 %  Not Estab  Basos 0 %  Not Estab  Neutrophils (Absolute) 3 3 x10E3/uL  1 4-7 0   Lymphs (Absolute) 1 5 x10E3/uL  0 7-3 1   Monocytes(Absolute) 0 2 x10E3/uL  0 1-0 9   Eos (Absolute) 0 1 x10E3/uL  0 0-0 4   Baso (Absolute) 0 0 x10E3/uL  0 0-0 2   Immature Granulocytes 0 %  Not Estab     Immature Grans (Abs) 0 0 x10E3/uL  0 0-0 1     (1) COMPREHENSIVE METABOLIC PANEL [id number] 32:15HI Alexandru Phillip     Test Name Result Flag Reference   Glucose, Serum 101 mg/dL H 65-99   BUN 7 mg/dL  6-20   Creatinine, Serum 0 67 mg/dL  0 57-1 00   BUN/Creatinine Ratio 10  9-23   Sodium, Serum 140 mmol/L  134-144   Potassium, Serum 4 1 mmol/L  3 5-5 2   Chloride, Serum 100 mmol/L     Carbon Dioxide, Total 26 mmol/L  18-29   Calcium, Serum 9 3 mg/dL  8 7-10 2   Protein, Total, Serum 6 8 g/dL  6 0-8 5   Albumin, Serum 4 2 g/dL  3 5-5 5   Globulin, Total 2 6 g/dL  1 5-4 5   A/G Ratio 1 6  1 2-2 2   Bilirubin, Total 0 5 mg/dL  0 0-1 2   Alkaline Phosphatase, S 61 IU/L     AST (SGOT) 16 IU/L  0-40   ALT (SGPT) 17 IU/L  0-32   eGFR If NonAfricn Am 113 mL/min/1 73  >59   eGFR If Africn Am 131 mL/min/1 73  >59     (LC) Sedimentation Rate-Westergren [id number] 11:37AM Alexandru Phillip     Test Name Result Flag Reference   Sedimentation Rate-Westergren 8 mm/hr  0-32     (1) MAGNESIUM [id number] 11:37AM Alexandru Phillip     Test Name Result Flag Reference   Magnesium, Serum 2 1 mg/dL  1 6-2 3     (LC) TAYA w/Reflex [id number] 11:37AM Alexandru Phillip     Test Name Result Flag Reference   TAYA Direct Negative  Negative     (LC) Lyme Ab/Western Blot Reflex [id number] 11:37AM Alexandru Phillip     Test Name Result Flag Reference   Lyme IgG/IgM Ab <0 91 ISR  0 00-0 90   Negative         <0 91                                                 Equivocal  0 91 - 1 09                                                 Positive         >1 09   Lyme Disease Ab, Quant, IgM <0 80 index  0 00-0 79   Negative         <0 80                                                 Equivocal  0 80 - 1 19                                                 Positive         >1 19                  IgM levels may peak at 3-6 weeks post infection, then                  gradually decline

## 2018-02-28 NOTE — MISCELLANEOUS
Message   Recorded as Task   Date: 12/13/2017 01:47 PM, Created By: Alexandru Phillip   Task Name: Call Patient with results   Assigned To: [facility]   Regarding Patient: Libby Mendez, Status: Active   Comment:    Alexandru Phillip - 13 Dec 2017 1:47 PM     Patient Phone: [phone]   Jeane Lauren - 13 Dec 2017 2:38 PM     TASK REASSIGNED: Previously Assigned To Alexandru Phillip        Active Problems    1  History of Abdominal pain, right upper quadrant (789 01) (R10 11)   2  Acute bacterial sinusitis (461 9) (J01 90,B96 89)   3  History of Bilateral lower abdominal discomfort (789 03,789 04) (R10 31,R10 32)   4  Chronic cholecystitis (575 11) (K81 1)   5  Chronic constipation (564 00) (K59 09)   6  Dental disorder (525 9) (K08 9)   7  Encounter for routine pelvic examination (V72 31) (Z01 419)   8  Esophagitis, reflux (530 11) (K21 0)   9  Fibromyalgia (729 1) (M79 7)   10  Flu vaccine need (V04 81) (Z23)   11  Headache (784 0) (R51)   12  History of sore throat (V12 60) (Z87 09)   13  Hypoglycemia (251 2) (E16 2)   14  Impaired fasting glucose (790 21) (R73 01)   15  History of Neck muscle spasm (728 85) (M62 838)   16  Numbness and tingling in left upper extremity (782 0) (R20 0,R20 2)   17  Numbness and tingling of left side of face (782 0) (R20 0,R20 2)   18  Numbness of left lower extremity (782 0) (R20 0)   19  History of Rib pain on right side (786 50) (R07 81)   20  History of Right facial numbness (782 0) (R20 0)   21  Tension headache (307 81) (G44 209)   22  Tingling (782 0) (R20 2)    Current Meds   1  Leslie Contour Test In [employer]; USE 1 STRIP TWICE DAILY; Therapy: 59TPQ9954 to (Aleisha Sierra)  Requested for: 41XWM7013; Last   LP:60WQM4359 Ordered   2  Multiple Vitamins Oral Tablet Recorded   3  Polyethylene Glycol 3350 Oral Packet; PACK PO;   Therapy: 42UHU3807 to (Last Rx:09Nov2015)  Requested for: 68JEX5505 Ordered   4   RaNITidine HCl - 150 MG Oral Tablet; TAKE 1 TABLET AT BEDTIME; Therapy: 71LMR7210 to (Evaluate:21Oct2015); Last Rx:23Jun2015 Ordered    Allergies    1   No Known Drug Allergies    Signatures   Electronically signed by : Forest Hahn MD; Dec 13 2017  5:31PM EST                       (Author)

## 2018-03-05 ENCOUNTER — TELEPHONE (OUTPATIENT)
Dept: FAMILY MEDICINE CLINIC | Facility: HOSPITAL | Age: 38
End: 2018-03-05

## 2018-03-16 DIAGNOSIS — R73.01 IMPAIRED FASTING BLOOD SUGAR: Primary | ICD-10-CM

## 2018-03-19 RX ORDER — LANCETS 30 GAUGE
EACH MISCELLANEOUS
Qty: 100 EACH | Refills: 1 | Status: SHIPPED | OUTPATIENT
Start: 2018-03-19 | End: 2019-04-03 | Stop reason: SDUPTHER

## 2019-02-26 DIAGNOSIS — J31.0 RHINITIS, UNSPECIFIED TYPE: Primary | ICD-10-CM

## 2019-02-26 RX ORDER — FLUTICASONE PROPIONATE 50 MCG
SPRAY, SUSPENSION (ML) NASAL
Qty: 16 G | Refills: 0 | Status: SHIPPED | OUTPATIENT
Start: 2019-02-26

## 2019-04-02 ENCOUNTER — TELEPHONE (OUTPATIENT)
Dept: FAMILY MEDICINE CLINIC | Facility: HOSPITAL | Age: 39
End: 2019-04-02

## 2019-04-02 ENCOUNTER — OFFICE VISIT (OUTPATIENT)
Dept: FAMILY MEDICINE CLINIC | Facility: HOSPITAL | Age: 39
End: 2019-04-02
Payer: COMMERCIAL

## 2019-04-02 VITALS
BODY MASS INDEX: 33.7 KG/M2 | HEART RATE: 80 BPM | WEIGHT: 190.2 LBS | DIASTOLIC BLOOD PRESSURE: 68 MMHG | SYSTOLIC BLOOD PRESSURE: 118 MMHG | TEMPERATURE: 98.7 F | HEIGHT: 63 IN

## 2019-04-02 DIAGNOSIS — N62 LARGE BREASTS: ICD-10-CM

## 2019-04-02 DIAGNOSIS — L23.2 ALLERGIC CONTACT DERMATITIS DUE TO COSMETICS: Primary | ICD-10-CM

## 2019-04-02 PROBLEM — R73.01 IMPAIRED FASTING GLUCOSE: Status: ACTIVE | Noted: 2017-04-01

## 2019-04-02 PROCEDURE — 99213 OFFICE O/P EST LOW 20 MIN: CPT | Performed by: NURSE PRACTITIONER

## 2019-04-02 PROCEDURE — 1036F TOBACCO NON-USER: CPT | Performed by: NURSE PRACTITIONER

## 2019-04-02 PROCEDURE — 3008F BODY MASS INDEX DOCD: CPT | Performed by: NURSE PRACTITIONER

## 2019-04-02 RX ORDER — METHYLPREDNISOLONE 4 MG/1
TABLET ORAL
Qty: 21 EACH | Refills: 0 | Status: SHIPPED | OUTPATIENT
Start: 2019-04-02 | End: 2019-06-05

## 2019-04-03 ENCOUNTER — TELEPHONE (OUTPATIENT)
Dept: FAMILY MEDICINE CLINIC | Facility: HOSPITAL | Age: 39
End: 2019-04-03

## 2019-04-03 DIAGNOSIS — R73.01 IMPAIRED FASTING BLOOD SUGAR: ICD-10-CM

## 2019-04-04 DIAGNOSIS — L23.2 ALLERGIC CONTACT DERMATITIS DUE TO COSMETICS: Primary | ICD-10-CM

## 2019-05-26 ENCOUNTER — APPOINTMENT (EMERGENCY)
Dept: RADIOLOGY | Facility: HOSPITAL | Age: 39
End: 2019-05-26
Payer: COMMERCIAL

## 2019-05-26 ENCOUNTER — HOSPITAL ENCOUNTER (EMERGENCY)
Facility: HOSPITAL | Age: 39
Discharge: HOME | End: 2019-05-26
Payer: COMMERCIAL

## 2019-05-26 VITALS
OXYGEN SATURATION: 98 % | SYSTOLIC BLOOD PRESSURE: 106 MMHG | HEART RATE: 75 BPM | TEMPERATURE: 99.6 F | BODY MASS INDEX: 35.07 KG/M2 | DIASTOLIC BLOOD PRESSURE: 58 MMHG | WEIGHT: 198 LBS | RESPIRATION RATE: 16 BRPM
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
## 2019-05-26 DIAGNOSIS — M54.2 NECK PAIN: ICD-10-CM

## 2019-05-26 DIAGNOSIS — S20.219A CHEST WALL CONTUSION: ICD-10-CM

## 2019-05-26 PROCEDURE — 99283 EMERGENCY DEPT VISIT LOW MDM: CPT | Performed by: PHYSICIAN ASSISTANT

## 2019-05-26 PROCEDURE — 93005 ELECTROCARDIOGRAM TRACING: CPT

## 2019-05-26 PROCEDURE — 71046 X-RAY EXAM CHEST 2 VIEWS: CPT

## 2019-05-26 PROCEDURE — 99284 EMERGENCY DEPT VISIT MOD MDM: CPT

## 2019-05-26 RX ORDER — IBUPROFEN 400 MG/1
400 TABLET ORAL ONCE
Status: COMPLETED | OUTPATIENT
Start: 2019-05-26 | End: 2019-05-26

## 2019-05-26 RX ADMIN — IBUPROFEN 400 MG: 400 TABLET ORAL at 12:57
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
## 2019-05-27 LAB
ATRIAL RATE: 68 BPM
P AXIS: 70 DEGREES
PR INTERVAL: 156 MS
QRS AXIS: 53 DEGREES
QRSD INTERVAL: 84 MS
QT INTERVAL: 424 MS
QTC INTERVAL: 450 MS
T WAVE AXIS: 59 DEGREES
VENTRICULAR RATE: 68 BPM

## 2019-05-27 PROCEDURE — 93010 ELECTROCARDIOGRAM REPORT: CPT | Performed by: INTERNAL MEDICINE

## 2019-05-30 ENCOUNTER — VBI (OUTPATIENT)
Dept: ADMINISTRATIVE | Facility: OTHER | Age: 39
End: 2019-05-30

## 2019-06-05 ENCOUNTER — OFFICE VISIT (OUTPATIENT)
Dept: FAMILY MEDICINE CLINIC | Facility: HOSPITAL | Age: 39
End: 2019-06-05
Payer: COMMERCIAL

## 2019-06-05 VITALS
SYSTOLIC BLOOD PRESSURE: 124 MMHG | BODY MASS INDEX: 34.8 KG/M2 | DIASTOLIC BLOOD PRESSURE: 72 MMHG | HEART RATE: 95 BPM | TEMPERATURE: 98.1 F | HEIGHT: 63 IN | WEIGHT: 196.4 LBS

## 2019-06-05 DIAGNOSIS — M54.2 NECK PAIN: Primary | ICD-10-CM

## 2019-06-05 DIAGNOSIS — N62 LARGE BREASTS: ICD-10-CM

## 2019-06-05 DIAGNOSIS — K21.9 GASTROESOPHAGEAL REFLUX DISEASE WITHOUT ESOPHAGITIS: ICD-10-CM

## 2019-06-05 DIAGNOSIS — R07.89 CHEST WALL PAIN: ICD-10-CM
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
## 2019-06-05 DIAGNOSIS — E66.9 OBESITY (BMI 30-39.9): ICD-10-CM

## 2019-06-05 PROCEDURE — 99213 OFFICE O/P EST LOW 20 MIN: CPT | Performed by: FAMILY MEDICINE

## 2019-06-12 ENCOUNTER — CONSULT (OUTPATIENT)
Dept: BARIATRICS | Facility: CLINIC | Age: 39
End: 2019-06-12
Payer: COMMERCIAL

## 2019-06-12 VITALS
SYSTOLIC BLOOD PRESSURE: 120 MMHG | WEIGHT: 194.6 LBS | BODY MASS INDEX: 33.22 KG/M2 | TEMPERATURE: 98.4 F | RESPIRATION RATE: 16 BRPM | HEART RATE: 74 BPM | DIASTOLIC BLOOD PRESSURE: 70 MMHG | HEIGHT: 64 IN

## 2019-06-12 DIAGNOSIS — R53.83 FATIGUE: ICD-10-CM

## 2019-06-12 DIAGNOSIS — R73.01 IMPAIRED FASTING GLUCOSE: ICD-10-CM

## 2019-06-12 DIAGNOSIS — Z13.1 SCREENING FOR DIABETES MELLITUS: ICD-10-CM

## 2019-06-12 DIAGNOSIS — Z13.220 SCREENING CHOLESTEROL LEVEL: ICD-10-CM

## 2019-06-12 DIAGNOSIS — G47.00 INSOMNIA: ICD-10-CM

## 2019-06-12 DIAGNOSIS — M79.7 FIBROMYALGIA: ICD-10-CM

## 2019-06-12 DIAGNOSIS — E66.9 OBESITY (BMI 30-39.9): Primary | ICD-10-CM

## 2019-06-12 PROCEDURE — 99243 OFF/OP CNSLTJ NEW/EST LOW 30: CPT | Performed by: FAMILY MEDICINE

## 2019-09-24 ENCOUNTER — OFFICE VISIT (OUTPATIENT)
Dept: FAMILY MEDICINE CLINIC | Facility: HOSPITAL | Age: 39
End: 2019-09-24
Payer: COMMERCIAL

## 2019-09-24 VITALS
OXYGEN SATURATION: 96 % | WEIGHT: 196.8 LBS | DIASTOLIC BLOOD PRESSURE: 74 MMHG | TEMPERATURE: 98.4 F | HEIGHT: 64 IN | HEART RATE: 78 BPM | BODY MASS INDEX: 33.6 KG/M2 | SYSTOLIC BLOOD PRESSURE: 126 MMHG

## 2019-09-24 DIAGNOSIS — Z13.0 SCREENING FOR ENDOCRINE, METABOLIC AND IMMUNITY DISORDER: ICD-10-CM

## 2019-09-24 DIAGNOSIS — Z23 ENCOUNTER FOR IMMUNIZATION: ICD-10-CM

## 2019-09-24 DIAGNOSIS — Z00.00 ANNUAL PHYSICAL EXAM: Primary | ICD-10-CM

## 2019-09-24 DIAGNOSIS — Z13.220 SCREENING CHOLESTEROL LEVEL: ICD-10-CM

## 2019-09-24 DIAGNOSIS — Z13.228 SCREENING FOR ENDOCRINE, METABOLIC AND IMMUNITY DISORDER: ICD-10-CM

## 2019-09-24 DIAGNOSIS — Z13.29 SCREENING FOR ENDOCRINE, METABOLIC AND IMMUNITY DISORDER: ICD-10-CM

## 2019-09-24 DIAGNOSIS — R73.01 IMPAIRED FASTING GLUCOSE: ICD-10-CM

## 2019-09-24 PROCEDURE — 99395 PREV VISIT EST AGE 18-39: CPT | Performed by: NURSE PRACTITIONER

## 2019-09-24 PROCEDURE — 86580 TB INTRADERMAL TEST: CPT | Performed by: NURSE PRACTITIONER

## 2019-09-24 NOTE — PROGRESS NOTES
Geremias Padilla MD    NAME: Bina Du  AGE: 41 y o  SEX: female  : 1980     DATE: 2019     Assessment and Plan:     Problem List Items Addressed This Visit     None      Visit Diagnoses     Annual physical exam    -  Primary    BMI 34 0-34 9,adult              Immunizations and preventive care screenings were discussed with patient today  Appropriate education was printed on patient's after visit summary  Counseling:  Alcohol/drug use: discussed moderation in alcohol intake, the recommendations for healthy alcohol use, and avoidance of illicit drug use  Dental Health: discussed importance of regular tooth brushing, flossing, and dental visits  Injury prevention: discussed safety/seat belts, safety helmets, smoke detectors, carbon dioxide detectors, and smoking near bedding or upholstery  Sexual health: discussed sexually transmitted diseases, partner selection, use of condoms, avoidance of unintended pregnancy, and contraceptive alternatives  · Exercise: the importance of regular exercise/physical activity was discussed  Recommend exercise 3-5 times per week for at least 30 minutes  BMI Counseling: Body mass index is 34 31 kg/m²  The BMI is above normal  Nutrition recommendations include decreasing portion sizes, encouraging healthy choices of fruits and vegetables, decreasing fast food intake, consuming healthier snacks, limiting drinks that contain sugar, moderation in carbohydrate intake and increasing intake of lean protein  Exercise recommendations include moderate physical activity 150 minutes/week  No pharmacotherapy was ordered  No follow-ups on file  Chief Complaint:     Chief Complaint   Patient presents with    Annual Exam      History of Present Illness:     Adult Annual Physical   Patient here for a comprehensive physical exam  The patient reports no problems  Reports poor diet and no exercise   Had evaluation with bariatric medicine   states they did not like her and have no interest in pursuing further  Poor sleep  Reports she goes to bed late and gets up early  No issues falling or staying asleep  Diet and Physical Activity  · Diet/Nutrition: poor diet  · Exercise: no formal exercise  Depression Screening  PHQ-9 Depression Screening    PHQ-9:    Frequency of the following problems over the past two weeks:            General Health  · Sleep: gets 4-6 hours of sleep on average  · Hearing: normal - bilateral   · Vision: no vision problems, most recent eye exam >1 year ago and wears glasses  · Dental: regular dental visits  /GYN Health  · Last menstrual period: 9/5/19  · Contraceptive method: tubal ligation  · History of STDs?: no      Review of Systems:     Review of Systems   Constitutional: Negative  HENT: Negative  Negative for congestion, dental problem, ear pain, hearing loss, postnasal drip, rhinorrhea, sinus pressure, sinus pain, sore throat, tinnitus and trouble swallowing  Eyes: Negative  Respiratory: Negative  Cardiovascular: Negative  Gastrointestinal: Negative  Endocrine: Negative  Genitourinary: Negative  Musculoskeletal: Negative  Skin: Negative  Allergic/Immunologic: Negative  Neurological: Negative  Hematological: Negative  Psychiatric/Behavioral: Negative         Past Medical History:     Past Medical History:   Diagnosis Date    Carpal tunnel syndrome, unspecified upper limb     Cholelithiasis     Last assessed 1/7/2014     Chronic constipation     Last assessed 4/1/2017     Dental disorder     Last assessed 1/28/2016     Dysuria     Resolved 8/11/2015     Fibromyalgia     Hypoglycemia     Numbness and tingling of left side of face     Last assessed 12/11/2017     Numbness and tingling of left upper extremity     Last assessed 12/11/2017     Numbness of left lower extremity     Last assessed 12/11/2017     Right facial numbness     Resolved 11/6/2017     Tingling     Last assessed 63/48/2315     Umbilical hernia       Past Surgical History:     Past Surgical History:   Procedure Laterality Date    CHOLECYSTECTOMY      HERNIA REPAIR  68/53/7417    Umbilical hernia repair   Managed by: Gaston Lay (General Surgery)     LAPAROSCOPIC CHOLECYSTECTOMY  01/16/2014    Managed by: Gaston Lay (General Surgery)     TUBAL LIGATION        Social History:     Social History     Socioeconomic History    Marital status: /Civil Union     Spouse name: None    Number of children: None    Years of education: None    Highest education level: None   Occupational History    None   Social Needs    Financial resource strain: None    Food insecurity:     Worry: None     Inability: None    Transportation needs:     Medical: None     Non-medical: None   Tobacco Use    Smoking status: Never Smoker    Smokeless tobacco: Never Used   Substance and Sexual Activity    Alcohol use: No    Drug use: No    Sexual activity: None   Lifestyle    Physical activity:     Days per week: None     Minutes per session: None    Stress: None   Relationships    Social connections:     Talks on phone: None     Gets together: None     Attends [employer] service: None     Active member of club or organization: None     Attends meetings of clubs or organizations: None     Relationship status: None    Intimate partner violence:     Fear of current or ex partner: None     Emotionally abused: None     Physically abused: None     Forced sexual activity: None   Other Topics Concern    None   Social History Narrative    Caffeine use    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 - As per [employer]       Family History:     Family History   Problem Relation Age of Onset    Anemia Mother     Hypertension Mother     Diabetes type II Mother     Diabetes type II Father     Arthritis Family     Coronary artery disease Family     Diabetes Family     Hyperlipidemia Family     Osteoporosis Family       Current Medications:     Current Outpatient Medications   Medication Sig Dispense Refill    ELISSA MICROLET LANCETS lancets TEST once daily 100 each 1    fluticasone (FLONASE) 50 mcg/act nasal spray instill 2 sprays into each nostril once daily 16 g 0    glucose blood (ELISSA CONTOUR TEST) test strip TEST once daily 100 each 1    multivitamin (THERAGRAN) TABS Take 1 tablet by mouth daily   ranitidine (ZANTAC) 150 mg tablet Take 1 tablet by mouth       No current facility-administered medications for this visit  Allergies: Allergies   Allergen Reactions    Morphine      "feels funny"    Prednisone Rash      Physical Exam:     /74 (BP Location: Left arm, Patient Position: Sitting, Cuff Size: Standard)   Pulse 78   Temp 98 4 °F (36 9 °C) (Tympanic)   Ht 5' 3 5" (1 613 m)   Wt 89 3 kg (196 lb 12 8 oz)   SpO2 96%   BMI 34 31 kg/m²     Physical Exam   Constitutional: She is oriented to person, place, and time  She appears well-developed and well-nourished  HENT:   Head: Normocephalic and atraumatic  Right Ear: External ear normal    Left Ear: External ear normal    Mouth/Throat: Oropharynx is clear and moist    Eyes: Pupils are equal, round, and reactive to light  Conjunctivae are normal    Neck: Normal range of motion  Neck supple  No thyromegaly present  Cardiovascular: Regular rhythm and normal heart sounds  No murmur heard  Pulmonary/Chest: Effort normal and breath sounds normal    Abdominal: Soft  Bowel sounds are normal  There is no hepatosplenomegaly  There is no tenderness  Musculoskeletal: Normal range of motion  Lymphadenopathy:     She has no cervical adenopathy  Neurological: She is alert and oriented to person, place, and time  Skin: Skin is warm and dry  Capillary refill takes less than 2 seconds  Psychiatric: She has a normal mood and affect   Her behavior is normal  Judgment and thought content normal  Vitals reviewed        Elihu Roa, Rosetta Barthel MD

## 2019-09-24 NOTE — PATIENT INSTRUCTIONS
Wellness Visit for Adults   AMBULATORY CARE:   A wellness visit  is when you see your healthcare provider to get screened for health problems  You can also get advice on how to stay healthy  Write down your questions so you remember to ask them  Ask your healthcare provider how often you should have a wellness visit  What happens at a wellness visit:  Your healthcare provider will ask about your health, and your family history of health problems  This includes high blood pressure, heart disease, and cancer  He or she will ask if you have symptoms that concern you, if you smoke, and about your mood  You may also be asked about your intake of medicines, supplements, food, and alcohol  Any of the following may be done:  · Your weight  will be checked  Your height may also be checked so your body mass index (BMI) can be calculated  Your BMI shows if you are at a healthy weight  · Your blood pressure  and heart rate will be checked  Your temperature may also be checked  · Blood and urine tests  may be done  Blood tests may be done to check your cholesterol levels  Abnormal cholesterol levels increase your risk for heart disease and stroke  You may also need a blood or urine test to check for diabetes if you are at increased risk  Urine tests may be done to look for signs of an infection or kidney disease  · A physical exam  includes checking your heartbeat and lungs with a stethoscope  Your healthcare provider may also check your skin to look for sun damage  · Screening tests  may be recommended  A screening test is done to check for diseases that may not cause symptoms  The screening tests you may need depend on your age, gender, family history, and lifestyle habits  For example, colorectal screening may be recommended if you are 48years old or older  Screening tests you need if you are a woman:   · A Pap smear  is used to screen for cervical cancer   Pap smears are usually done every 3 to 5 years depending on your age  You may need them more often if you have had abnormal Pap smear test results in the past  Ask your healthcare provider how often you should have a Pap smear  · A mammogram  is an x-ray of your breasts to screen for breast cancer  Experts recommend mammograms every 2 years starting at age 48 years  You may need a mammogram at age 52 years or younger if you have an increased risk for breast cancer  Talk to your healthcare provider about when you should start having mammograms and how often you need them  Vaccines you may need:   · Get an influenza vaccine  every year  The influenza vaccine protects you from the flu  Several types of viruses cause the flu  The viruses change over time, so new vaccines are made each year  · Get a tetanus-diphtheria (Td) booster vaccine  every 10 years  This vaccine protects you against tetanus and diphtheria  Tetanus is a severe infection that may cause painful muscle spasms and lockjaw  Diphtheria is a severe bacterial infection that causes a thick covering in the back of your mouth and throat  · Get a human papillomavirus (HPV) vaccine  if you are female and aged 23 to 32 or male 23 to 24 and never received it  This vaccine protects you from HPV infection  HPV is the most common infection spread by sexual contact  HPV may also cause vaginal, penile, and anal cancers  · Get a pneumococcal vaccine  if you are aged 72 years or older  The pneumococcal vaccine is an injection given to protect you from pneumococcal disease  Pneumococcal disease is an infection caused by pneumococcal bacteria  The infection may cause pneumonia, meningitis, or an ear infection  · Get a shingles vaccine  if you are aged 61 or older, even if you have had shingles before  The shingles vaccine is an injection to protect you from the varicella-zoster virus  This is the same virus that causes chickenpox   Shingles is a painful rash that develops in people who had chickenpox or have been exposed to the virus  How to eat healthy:  My Plate is a model for planning healthy meals  It shows the types and amounts of foods that should go on your plate  Fruits and vegetables make up about half of your plate, and grains and protein make up the other half  A serving of dairy is included on the side of your plate  The amount of calories and serving sizes you need depends on your age, gender, weight, and height  Examples of healthy foods are listed below:  · Eat a variety of vegetables  such as dark green, red, and orange vegetables  You can also include canned vegetables low in sodium (salt) and frozen vegetables without added butter or sauces  · Eat a variety of fresh fruits , canned fruit in 100% juice, frozen fruit, and dried fruit  · Include whole grains  At least half of the grains you eat should be whole grains  Examples include whole-wheat bread, wheat pasta, brown rice, and whole-grain cereals such as oatmeal     · Eat a variety of protein foods such as seafood (fish and shellfish), lean meat, and poultry without skin (turkey and chicken)  Examples of lean meats include pork leg, shoulder, or tenderloin, and beef round, sirloin, tenderloin, and extra lean ground beef  Other protein foods include eggs and egg substitutes, beans, peas, soy products, nuts, and seeds  · Choose low-fat dairy products such as skim or 1% milk or low-fat yogurt, cheese, and cottage cheese  · Limit unhealthy fats  such as butter, hard margarine, and shortening  Exercise:  Exercise at least 30 minutes per day on most days of the week  Some examples of exercise include walking, biking, dancing, and swimming  You can also fit in more physical activity by taking the stairs instead of the elevator or parking farther away from stores  Include muscle strengthening activities 2 days each week  Regular exercise provides many health benefits   It helps you manage your weight, and decreases your risk for type 2 diabetes, heart disease, stroke, and high blood pressure  Exercise can also help improve your mood  Ask your healthcare provider about the best exercise plan for you  General health and safety guidelines:   · Do not smoke  Nicotine and other chemicals in cigarettes and cigars can cause lung damage  Ask your healthcare provider for information if you currently smoke and need help to quit  E-cigarettes or smokeless tobacco still contain nicotine  Talk to your healthcare provider before you use these products  · Limit alcohol  A drink of alcohol is 12 ounces of beer, 5 ounces of wine, or 1½ ounces of liquor  · Lose weight, if needed  Being overweight increases your risk of certain health conditions  These include heart disease, high blood pressure, type 2 diabetes, and certain types of cancer  · Protect your skin  Do not sunbathe or use tanning beds  Use sunscreen with a SPF 15 or higher  Apply sunscreen at least 15 minutes before you go outside  Reapply sunscreen every 2 hours  Wear protective clothing, hats, and sunglasses when you are outside  · Drive safely  Always wear your seatbelt  Make sure everyone in your car wears a seatbelt  A seatbelt can save your life if you are in an accident  Do not use your cell phone when you are driving  This could distract you and cause an accident  Pull over if you need to make a call or send a text message  · Practice safe sex  Use latex condoms if are sexually active and have more than one partner  Your healthcare provider may recommend screening tests for sexually transmitted infections (STIs)  · Wear helmets, lifejackets, and protective gear  Always wear a helmet when you ride a bike or motorcycle, go skiing, or play sports that could cause a head injury  Wear protective equipment when you play sports  Wear a lifejacket when you are on a boat or doing water sports    © 2017 [address] [employer] Information is for End User's use only and may not be sold, redistributed or otherwise used for commercial purposes  All illustrations and images included in CareNotes® are the copyrighted property of [employer]  or Santino Booth  The above information is an  only  It is not intended as medical advice for individual conditions or treatments  Talk to your doctor, nurse or pharmacist before following any medical regimen to see if it is safe and effective for you  Obesity   AMBULATORY CARE:   Obesity  is when your body mass index (BMI) is greater than 30  Your healthcare provider will use your height and weight to measure your BMI  The risks of obesity include  many health problems, such as injuries or physical disability  You may need tests to check for the following:  · Diabetes     · High blood pressure or high cholesterol     · Heart disease     · Gallbladder or liver disease     · Cancer of the colon, breast, prostate, liver, or kidney     · Sleep apnea     · Arthritis or gout  Seek care immediately if:   · You have a severe headache, confusion, or difficulty speaking  · You have weakness on one side of your body  · You have chest pain, sweating, or shortness of breath  Contact your healthcare provider if:   · You have symptoms of gallbladder or liver disease, such as pain in your upper abdomen  · You have knee or hip pain and discomfort while walking  · You have symptoms of diabetes, such as intense hunger and thirst, and frequent urination  · You have symptoms of sleep apnea, such as snoring or daytime sleepiness  · You have questions or concerns about your condition or care  Treatment for obesity  focuses on helping you lose weight to improve your health  Even a small decrease in BMI can reduce the risk for many health problems  Your healthcare provider will help you set a weight-loss goal   · Lifestyle changes  are the first step in treating obesity   These include making healthy food choices and getting regular physical activity  Your healthcare provider may suggest a weight-loss program that involves coaching, education, and therapy  · Medicine  may help you lose weight when it is used with a healthy diet and physical activity  · Surgery  can help you lose weight if you are very obese and have other health problems  There are several types of weight-loss surgery  Ask your healthcare provider for more information  Be successful losing weight:   · Set small, realistic goals  An example of a small goal is to walk for 20 minutes 5 days a week  Anther goal is to lose 5% of your body weight  · Tell friends, family members, and coworkers about your goals  and ask for their support  Ask a friend to lose weight with you, or join a weight-loss support group  · Identify foods or triggers that may cause you to overeat , and find ways to avoid them  Remove tempting high-calorie foods from your home and workplace  Place a bowl of fresh fruit on your kitchen counter  If stress causes you to eat, then find other ways to cope with stress  · Keep a diary to track what you eat and drink  Also write down how many minutes of physical activity you do each day  Weigh yourself once a week and record it in your diary  Eating changes: You will need to eat 500 to 1,000 fewer calories each day than you currently eat to lose 1 to 2 pounds a week  The following changes will help you cut calories:  · Eat smaller portions  Use small plates, no larger than 9 inches in diameter  Fill your plate half full of fruits and vegetables  Measure your food using measuring cups until you know what a serving size looks like  · Eat 3 meals and 1 or 2 snacks each day  Plan your meals in advance  Kurtis L[employer] and eat at home most of the time  Eat slowly  · Eat fruits and vegetables at every meal   They are low in calories and high in fiber, which makes you feel full   Do not add butter, margarine, or cream sauce to vegetables  Use herbs to season steamed vegetables  · Eat less fat and fewer fried foods  Eat more baked or grilled chicken and fish  These protein sources are lower in calories and fat than red meat  Limit fast food  Dress your salads with olive oil and vinegar instead of bottled dressing  · Limit the amount of sugar you eat  Do not drink sugary beverages  Limit alcohol  Activity changes:  Physical activity is good for your body in many ways  It helps you burn calories and build strong muscles  It decreases stress and depression, and improves your mood  It can also help you sleep better  Talk to your healthcare provider before you begin an exercise program   · Exercise for at least 30 minutes 5 days a week  Start slowly  Set aside time each day for physical activity that you enjoy and that is convenient for you  It is best to do both weight training and an activity that increases your heart rate, such as walking, bicycling, or swimming  · Find ways to be more active  Do yard work and housecleaning  Walk up the stairs instead of using elevators  Spend your leisure time going to events that require walking, such as outdoor festivals or fairs  This extra physical activity can help you lose weight and keep it off  Follow up with your healthcare provider as directed: You may need to meet with a dietitian  Write down your questions so you remember to ask them during your visits  © 2017 [employer] Information is for End User's use only and may not be sold, redistributed or otherwise used for commercial purposes  All illustrations and images included in CareNotes® are the copyrighted property of [employer]  or [employer]ino [employer]  The above information is an  only  It is not intended as medical advice for individual conditions or treatments   Talk to your doctor, nurse or pharmacist before following any medical regimen to see if it is safe and effective for you

## 2019-09-27 LAB
INDURATION: 0 MM
TB SKIN TEST: NEGATIVE

## 2019-12-13 ENCOUNTER — APPOINTMENT (OUTPATIENT)
Dept: URGENT CARE | Facility: CLINIC | Age: 39
End: 2019-12-13
Payer: OTHER MISCELLANEOUS

## 2019-12-13 ENCOUNTER — APPOINTMENT (OUTPATIENT)
Dept: RADIOLOGY | Facility: CLINIC | Age: 39
End: 2019-12-13
Payer: OTHER MISCELLANEOUS

## 2019-12-13 DIAGNOSIS — M79.645 FINGER PAIN, LEFT: ICD-10-CM

## 2019-12-13 DIAGNOSIS — M79.645 FINGER PAIN, LEFT: Primary | ICD-10-CM

## 2019-12-13 PROCEDURE — 99283 EMERGENCY DEPT VISIT LOW MDM: CPT | Performed by: PHYSICIAN ASSISTANT

## 2019-12-13 PROCEDURE — 73130 X-RAY EXAM OF HAND: CPT

## 2019-12-13 PROCEDURE — G0382 LEV 3 HOSP TYPE B ED VISIT: HCPCS | Performed by: PHYSICIAN ASSISTANT

## 2019-12-17 ENCOUNTER — APPOINTMENT (OUTPATIENT)
Dept: URGENT CARE | Facility: CLINIC | Age: 39
End: 2019-12-17
Payer: OTHER MISCELLANEOUS

## 2019-12-17 PROCEDURE — 99213 OFFICE O/P EST LOW 20 MIN: CPT | Performed by: PHYSICIAN ASSISTANT

## 2019-12-27 ENCOUNTER — APPOINTMENT (OUTPATIENT)
Dept: URGENT CARE | Facility: CLINIC | Age: 39
End: 2019-12-27
Payer: OTHER MISCELLANEOUS

## 2019-12-27 PROCEDURE — 99213 OFFICE O/P EST LOW 20 MIN: CPT | Performed by: FAMILY MEDICINE

## 2020-01-27 ENCOUNTER — TELEPHONE (OUTPATIENT)
Dept: FAMILY MEDICINE CLINIC | Facility: HOSPITAL | Age: 40
End: 2020-01-27

## 2020-01-27 NOTE — TELEPHONE ENCOUNTER
Patient has a burning sensation and some inflammation in her genital area  Changed soaps about 2 weeks ago  Started with symptoms 1 1/2 weeks ago  Are you able to send something for her or would you like her to follow up with GYN or come in to the office for a visit?

## 2020-01-27 NOTE — TELEPHONE ENCOUNTER
Patient having some discomfort in vaginal area - thinks its from some kind of soap    Asking for something to the pharm    pcb

## 2020-01-27 NOTE — TELEPHONE ENCOUNTER
Please call  I can see her in the office but as expected will likely need an examination in the affected area  She can see Fariha Xiao if she prefers or go directly to GYN  Her choice but hard to treat over the phone without seeing her

## 2020-01-29 ENCOUNTER — OFFICE VISIT (OUTPATIENT)
Dept: FAMILY MEDICINE CLINIC | Facility: HOSPITAL | Age: 40
End: 2020-01-29
Payer: COMMERCIAL

## 2020-01-29 VITALS
SYSTOLIC BLOOD PRESSURE: 128 MMHG | HEART RATE: 75 BPM | WEIGHT: 194 LBS | HEIGHT: 64 IN | TEMPERATURE: 97.2 F | DIASTOLIC BLOOD PRESSURE: 80 MMHG | BODY MASS INDEX: 33.12 KG/M2

## 2020-01-29 DIAGNOSIS — M79.7 FIBROMYALGIA: ICD-10-CM

## 2020-01-29 DIAGNOSIS — R31.9 URINARY TRACT INFECTION WITH HEMATURIA, SITE UNSPECIFIED: Primary | ICD-10-CM

## 2020-01-29 DIAGNOSIS — N39.0 URINARY TRACT INFECTION WITH HEMATURIA, SITE UNSPECIFIED: Primary | ICD-10-CM

## 2020-01-29 DIAGNOSIS — Z11.4 SCREENING FOR HIV (HUMAN IMMUNODEFICIENCY VIRUS): ICD-10-CM

## 2020-01-29 DIAGNOSIS — Z13.6 SCREENING FOR CARDIOVASCULAR CONDITION: ICD-10-CM

## 2020-01-29 DIAGNOSIS — R73.01 IMPAIRED FASTING GLUCOSE: ICD-10-CM

## 2020-01-29 PROBLEM — Z13.220 SCREENING CHOLESTEROL LEVEL: Status: RESOLVED | Noted: 2019-06-12 | Resolved: 2020-01-29

## 2020-01-29 PROBLEM — Z13.1 SCREENING FOR DIABETES MELLITUS: Status: RESOLVED | Noted: 2019-06-12 | Resolved: 2020-01-29

## 2020-01-29 LAB
SL AMB  POCT GLUCOSE, UA: NORMAL
SL AMB LEUKOCYTE ESTERASE,UA: ABNORMAL
SL AMB POCT BILIRUBIN,UA: NEGATIVE
SL AMB POCT BLOOD,UA: 50
SL AMB POCT CLARITY,UA: CLEAR
SL AMB POCT COLOR,UA: YELLOW
SL AMB POCT KETONES,UA: NEGATIVE
SL AMB POCT NITRITE,UA: NEGATIVE
SL AMB POCT PH,UA: 7
SL AMB POCT SPECIFIC GRAVITY,UA: 1.01
SL AMB POCT URINE PROTEIN: ABNORMAL
SL AMB POCT UROBILINOGEN: NORMAL

## 2020-01-29 PROCEDURE — 81002 URINALYSIS NONAUTO W/O SCOPE: CPT | Performed by: FAMILY MEDICINE

## 2020-01-29 PROCEDURE — 1036F TOBACCO NON-USER: CPT | Performed by: FAMILY MEDICINE

## 2020-01-29 PROCEDURE — 99214 OFFICE O/P EST MOD 30 MIN: CPT | Performed by: FAMILY MEDICINE

## 2020-01-29 PROCEDURE — 3008F BODY MASS INDEX DOCD: CPT | Performed by: FAMILY MEDICINE

## 2020-01-29 RX ORDER — SULFAMETHOXAZOLE AND TRIMETHOPRIM 800; 160 MG/1; MG/1
1 TABLET ORAL 2 TIMES DAILY
Qty: 6 TABLET | Refills: 0 | Status: SHIPPED | OUTPATIENT
Start: 2020-01-29 | End: 2020-02-01

## 2020-01-29 NOTE — PROGRESS NOTES
Assessment/Plan:      Problem List Items Addressed This Visit        Endocrine    Impaired fasting glucose    Relevant Orders    CBC    Comprehensive metabolic panel    Hemoglobin A1C       Other    Fibromyalgia    Relevant Orders    Ambulatory referral to Rheumatology      Other Visit Diagnoses     Urinary tract infection with hematuria, site unspecified    -  Primary    Relevant Medications    sulfamethoxazole-trimethoprim (BACTRIM DS) 800-160 mg per tablet    Other Relevant Orders    Urine culture    POCT urine dip    Screening for HIV (human immunodeficiency virus)        Relevant Orders    Human Immunodeficiency Virus 1/2 Antigen / Antibody ( Fourth Generation) with Reflex Testing    Screening for cardiovascular condition        Relevant Orders    TSH, 3rd generation with Free T4 reflex    Lipid Panel with Direct LDL reflex         1  Patient with UTI clinically  UA dip suggestive of UTI  Send for culture  Empirically treat with Bactrim for 3 days  Patient with history of impaired fasting glucose  Update blood work  Continue dietary control  3  Patient with known fibromyalgia symptoms  She now would like to see a rheumatologist for further evaluation and possible treatments  In the past we had discussed treatment options which she does not want to be on regular medications  Subjective:   Chief Complaint   Patient presents with    Urinary Tract Infection    Urinary Frequency    Abdominal Pain        Patient ID: Dilcia Dunn is a 46 y o  female  Patient is here for a few days of burning in urination, urgency, frequency  No fever or chills  Noting some low back pain on the left side  Has been drinking a lot of fluids and cranberry juice  No vaginal bleeding no vaginal discharge  Patient also with a known fibromyalgia would like to see rheumatology          The following portions of the patient's history were reviewed and updated as appropriate: allergies, current medications, past family history, past medical history, past social history, past surgical history and problem list     Review of Systems   Constitutional: Negative  Negative for activity change, appetite change, chills, diaphoresis, fatigue and fever  HENT: Negative for congestion, facial swelling and sore throat  Respiratory: Negative  Negative for apnea, cough, chest tightness and shortness of breath  Cardiovascular: Negative  Negative for chest pain and palpitations  Gastrointestinal: Negative  Negative for abdominal distention, abdominal pain, blood in stool, constipation, diarrhea and nausea  Genitourinary: Positive for dysuria, frequency and urgency  Negative for difficulty urinating, flank pain, hematuria, menstrual problem and pelvic pain  Objective:  Vitals:    01/29/20 1708   BP: 128/80   Pulse: 75   Temp: (!) 97 2 °F (36 2 °C)   Weight: 88 kg (194 lb)   Height: 5' 3 5" (1 613 m)     BP Readings from Last 3 Encounters:   01/29/20 128/80   09/24/19 126/74   06/12/19 120/70      Wt Readings from Last 3 Encounters:   01/29/20 88 kg (194 lb)   09/24/19 89 3 kg (196 lb 12 8 oz)   06/12/19 88 3 kg (194 lb 9 6 oz)        Office Visit on 09/24/2019   Component Date Value Ref Range Status    TB Skin Test 09/27/2019 Negative   Final    Induration 09/27/2019 0  mm Final   ]       Physical Exam   Constitutional: She is oriented to person, place, and time  She appears well-developed and well-nourished  HENT:   Head: Normocephalic and atraumatic  Right Ear: External ear normal    Left Ear: External ear normal    Nose: Nose normal    Mouth/Throat: Oropharynx is clear and moist    Eyes: Pupils are equal, round, and reactive to light  Conjunctivae and EOM are normal    Neck: Normal range of motion  Neck supple  No tracheal deviation present  No thyromegaly present  Cardiovascular: Normal rate, regular rhythm and normal heart sounds  No murmur heard    Pulmonary/Chest: Effort normal and breath sounds normal  No respiratory distress  She has no wheezes  Abdominal: Soft  Bowel sounds are normal  She exhibits no shifting dullness, no pulsatile liver, no ascites and no mass  There is tenderness in the right lower quadrant and left lower quadrant  There is no rigidity, no rebound, no guarding and no CVA tenderness  Musculoskeletal: Normal range of motion  Neurological: She is oriented to person, place, and time  Skin: Skin is warm and dry

## 2020-01-31 LAB
BACTERIA UR CULT: NORMAL
Lab: NO GROWTH

## 2020-02-09 LAB
ALBUMIN SERPL-MCNC: 4.5 G/DL (ref 3.8–4.8)
ALBUMIN/GLOB SERPL: 1.9 {RATIO} (ref 1.2–2.2)
ALP SERPL-CCNC: 59 IU/L (ref 39–117)
ALT SERPL-CCNC: 20 IU/L (ref 0–32)
AST SERPL-CCNC: 18 IU/L (ref 0–40)
BILIRUB SERPL-MCNC: 0.6 MG/DL (ref 0–1.2)
BUN SERPL-MCNC: 8 MG/DL (ref 6–20)
BUN/CREAT SERPL: 11 (ref 9–23)
CALCIUM SERPL-MCNC: 9.2 MG/DL (ref 8.7–10.2)
CHLORIDE SERPL-SCNC: 101 MMOL/L (ref 96–106)
CHOLEST SERPL-MCNC: 137 MG/DL (ref 100–199)
CO2 SERPL-SCNC: 22 MMOL/L (ref 20–29)
CREAT SERPL-MCNC: 0.75 MG/DL (ref 0.57–1)
ERYTHROCYTE [DISTWIDTH] IN BLOOD BY AUTOMATED COUNT: 13.3 % (ref 11.7–15.4)
EST. AVERAGE GLUCOSE BLD GHB EST-MCNC: 126 MG/DL
GLOBULIN SER-MCNC: 2.4 G/DL (ref 1.5–4.5)
GLUCOSE SERPL-MCNC: 106 MG/DL (ref 65–99)
HBA1C MFR BLD: 6 % (ref 4.8–5.6)
HCT VFR BLD AUTO: 36.8 % (ref 34–46.6)
HDLC SERPL-MCNC: 54 MG/DL
HGB BLD-MCNC: 12.5 G/DL (ref 11.1–15.9)
HIV 1+2 AB+HIV1 P24 AG SERPL QL IA: NON REACTIVE
LDLC SERPL CALC-MCNC: 70 MG/DL (ref 0–99)
LDLC/HDLC SERPL: 1.3 RATIO (ref 0–3.2)
MCH RBC QN AUTO: 30.3 PG (ref 26.6–33)
MCHC RBC AUTO-ENTMCNC: 34 G/DL (ref 31.5–35.7)
MCV RBC AUTO: 89 FL (ref 79–97)
PLATELET # BLD AUTO: 245 X10E3/UL (ref 150–450)
POTASSIUM SERPL-SCNC: 3.9 MMOL/L (ref 3.5–5.2)
PROT SERPL-MCNC: 6.9 G/DL (ref 6–8.5)
RBC # BLD AUTO: 4.13 X10E6/UL (ref 3.77–5.28)
SL AMB EGFR AFRICAN AMERICAN: 117 ML/MIN/1.73
SL AMB EGFR NON AFRICAN AMERICAN: 101 ML/MIN/1.73
SL AMB VLDL CHOLESTEROL CALC: 13 MG/DL (ref 5–40)
SODIUM SERPL-SCNC: 139 MMOL/L (ref 134–144)
TRIGL SERPL-MCNC: 66 MG/DL (ref 0–149)
TSH SERPL DL<=0.005 MIU/L-ACNC: 1.11 UIU/ML (ref 0.45–4.5)
WBC # BLD AUTO: 6.2 X10E3/UL (ref 3.4–10.8)

## 2021-01-31 ENCOUNTER — NURSE TRIAGE (OUTPATIENT)
Dept: OTHER | Facility: OTHER | Age: 41
End: 2021-01-31

## 2021-01-31 ENCOUNTER — OFFICE VISIT (OUTPATIENT)
Dept: URGENT CARE | Facility: CLINIC | Age: 41
End: 2021-01-31
Payer: COMMERCIAL

## 2021-01-31 VITALS
DIASTOLIC BLOOD PRESSURE: 78 MMHG | BODY MASS INDEX: 34.23 KG/M2 | OXYGEN SATURATION: 97 % | WEIGHT: 193.2 LBS | HEIGHT: 63 IN | RESPIRATION RATE: 16 BRPM | SYSTOLIC BLOOD PRESSURE: 132 MMHG | HEART RATE: 67 BPM | TEMPERATURE: 97.8 F

## 2021-01-31 DIAGNOSIS — R30.0 DYSURIA: Primary | ICD-10-CM

## 2021-01-31 LAB
SL AMB  POCT GLUCOSE, UA: ABNORMAL
SL AMB LEUKOCYTE ESTERASE,UA: ABNORMAL
SL AMB POCT BILIRUBIN,UA: ABNORMAL
SL AMB POCT BLOOD,UA: ABNORMAL
SL AMB POCT CLARITY,UA: ABNORMAL
SL AMB POCT COLOR,UA: ABNORMAL
SL AMB POCT KETONES,UA: ABNORMAL
SL AMB POCT NITRITE,UA: ABNORMAL
SL AMB POCT PH,UA: ABNORMAL
SL AMB POCT SPECIFIC GRAVITY,UA: ABNORMAL
SL AMB POCT URINE PROTEIN: ABNORMAL
SL AMB POCT UROBILINOGEN: ABNORMAL

## 2021-01-31 PROCEDURE — 81002 URINALYSIS NONAUTO W/O SCOPE: CPT

## 2021-01-31 PROCEDURE — 99213 OFFICE O/P EST LOW 20 MIN: CPT | Performed by: PHYSICIAN ASSISTANT

## 2021-01-31 RX ORDER — OMEPRAZOLE 10 MG/1
10 CAPSULE, DELAYED RELEASE ORAL DAILY
COMMUNITY

## 2021-01-31 RX ORDER — CEPHALEXIN 500 MG/1
500 CAPSULE ORAL EVERY 12 HOURS SCHEDULED
Qty: 10 CAPSULE | Refills: 0 | Status: SHIPPED | OUTPATIENT
Start: 2021-01-31 | End: 2021-02-05

## 2021-01-31 NOTE — PROGRESS NOTES
NAME: Savage Kaplan is a 45 y o  female  : 1980    MRN: [id number]      Assessment and Plan   Dysuria [R30 0]  1  Dysuria  POCT urine dip    cephalexin (KEFLEX) 500 mg capsule       Urine dip unreadable due to azo use  Discussed will treat empirically but she should follow-up based off of symptomatic relief  If no improvement follow-up with PCP  Anything changes or worsens follow-up sooner go the ER  She acknowledges      Patient Instructions     Patient Instructions   Dysuria   WHAT YOU NEED TO KNOW:   Dysuria is difficulty urinating, or pain, burning, or discomfort with urination  Dysuria is usually a symptom of another problem  DISCHARGE INSTRUCTIONS:   Return to the emergency department if:   · You have severe back, side, or abdominal pain  · You have fever and shaking chills  · You vomit several times in a row  Contact your healthcare provider if:   · Your symptoms do not go away, even after treatment  · You have questions or concerns about your condition or care  Medicines:   · Medicines  may be given to help treat a bacterial infection or help decrease bladder spasms  · Take your medicine as directed  Contact your healthcare provider if you think your medicine is not helping or if you have side effects  Tell him of her if you are allergic to any medicine  Keep a list of the medicines, vitamins, and herbs you take  Include the amounts, and when and why you take them  Bring the list or the pill bottles to follow-up visits  Carry your medicine list with you in case of an emergency  Follow up with your healthcare provider as directed: Your healthcare provider may also refer you to a urologist or nephrologist to have additional testing  Write down your questions so you remember to ask them during your visits  Manage your dysuria:   · Drink more liquids  Liquids help flush out bacteria that may be causing an infection   Ask your healthcare provider how much liquid to drink each day and which liquids are best for you  · Take sitz baths as directed  Fill a bathtub with 4 to 6 inches of warm water  You may also use a sitz bath pan that fits over a toilet  Sit in the sitz bath for 20 minutes  Do this 2 to 3 times a day, or as directed  The warm water can help decrease pain and swelling  © Copyright [address] Information is for End User's use only and may not be sold, redistributed or otherwise used for commercial purposes  All illustrations and images included in CareNotes® are the copyrighted property of [employer]  or [address] [place] [address] [employer] [facility]  The above information is an  only  It is not intended as medical advice for individual conditions or treatments  Talk to your doctor, nurse or pharmacist before following any medical regimen to see if it is safe and effective for you  Proceed to ER if symptoms worsen  Chief Complaint     Chief Complaint   Patient presents with    Possible UTI     4 days of urinary frequency, low abdominal pressure, bladder fullness, bilateral low back pain R >L and nausea  Denies fever, chills, v/d   Pt took an OTC Azo like medication  History of Present Illness   Patient with no reported past medical history presents complaining of urinary frequency x4 days  She is here with her daughter and son  She also has lower abdominal pressure, lower back pain and some intermittent nausea  Denies any hematuria, burning, vaginal discharge or abnormal bleeding  No fevers, chills, vomiting or diarrhea  States she took over-the-counter azo and reports improvement with that  Denies any abdominal pain but rather suprapubic pressure like her "bladder is full  "  Denies any risk of pregnancy  Review of Systems   Review of Systems   Constitutional: Negative for chills and fever  Gastrointestinal: Positive for nausea  Negative for abdominal pain, diarrhea and vomiting     Genitourinary: Positive for frequency and urgency  Negative for decreased urine volume, dysuria, flank pain, hematuria, menstrual problem, pelvic pain, vaginal bleeding, vaginal discharge and vaginal pain  Musculoskeletal: Negative for myalgias  Current Medications       Current Outpatient Medications:     omeprazole (PriLOSEC) 10 mg delayed release capsule, Take 10 mg by mouth daily, Disp: , Rfl:     ELISSA MICROLET LANCETS lancets, TEST once daily, Disp: 100 each, Rfl: 1    cephalexin (KEFLEX) 500 mg capsule, Take 1 capsule (500 mg total) by mouth every 12 (twelve) hours for 5 days, Disp: 10 capsule, Rfl: 0    fluticasone (FLONASE) 50 mcg/act nasal spray, instill 2 sprays into each nostril once daily, Disp: 16 g, Rfl: 0    glucose blood (ELISSA CONTOUR TEST) test strip, TEST once daily, Disp: 100 each, Rfl: 1    multivitamin (THERAGRAN) TABS, Take 1 tablet by mouth daily  , Disp: , Rfl:     ranitidine (ZANTAC) 150 mg tablet, Take 1 tablet by mouth as needed, Disp: , Rfl:     Current Allergies     Allergies as of 01/31/2021 - Reviewed 01/31/2021   Allergen Reaction Noted    Morphine  05/26/2019    Prednisone Rash 06/05/2019              Past Medical History:   Diagnosis Date    Carpal tunnel syndrome, unspecified upper limb     Cholelithiasis     Last assessed 1/7/2014     Chronic constipation     Last assessed 4/1/2017     Dental disorder     Last assessed 1/28/2016     Dysuria     Resolved 8/11/2015     Fibromyalgia     Hypoglycemia     Numbness and tingling of left side of face     Last assessed 12/11/2017     Numbness and tingling of left upper extremity     Last assessed 12/11/2017     Numbness of left lower extremity     Last assessed 12/11/2017     Right facial numbness     Resolved 11/6/2017     Tingling     Last assessed 69/25/6245     Umbilical hernia        Past Surgical History:   Procedure Laterality Date    CHOLECYSTECTOMY      HERNIA REPAIR  32/75/3746    Umbilical hernia repair   Managed by: Remediso Powers (General Surgery)     LAPAROSCOPIC CHOLECYSTECTOMY  01/16/2014    Managed by: Jackson Youssef (General Surgery)     TUBAL LIGATION         Family History   Problem Relation Age of Onset    Anemia Mother     Hypertension Mother     Diabetes type II Mother     Diabetes type II Father     Arthritis Family     Coronary artery disease Family     Diabetes Family     Hyperlipidemia Family     Osteoporosis Family          Medications have been verified  The following portions of the patient's history were reviewed and updated as appropriate: allergies, current medications, past family history, past medical history, past social history, past surgical history and problem list     Objective   /78   Pulse 67   Temp 97 8 °F (36 6 °C)   Resp 16   Ht 5' 3" (1 6 m)   Wt 87 6 kg (193 lb 3 2 oz)   SpO2 97%   BMI 34 22 kg/m²      Physical Exam     Physical Exam  Vitals signs and nursing note reviewed  Constitutional:       General: She is not in acute distress  Appearance: Normal appearance  She is not ill-appearing, toxic-appearing or diaphoretic  Abdominal:      General: Abdomen is flat  There is no distension  Palpations: There is no mass  Tenderness: There is no abdominal tenderness  There is no right CVA tenderness, left CVA tenderness, guarding or rebound  Skin:     Capillary Refill: Capillary refill takes less than 2 seconds  Neurological:      Mental Status: She is alert and oriented to person, place, and time

## 2021-01-31 NOTE — TELEPHONE ENCOUNTER
Reason for Disposition   Side (flank) or lower back pain present   Urinating more frequently than usual (i e , frequency)    Answer Assessment - Initial Assessment Questions  1  SYMPTOM: "What's the main symptom you're concerned about?" (e g , frequency, incontinence)      Lower abdominal/pelvic pressure, urinary frequency, and lower back pain  Says she took OTC UTI test and says it came back positive  Pt denies pain when urinating  States she is still able to empty her bladder fully  2  ONSET: "When did the    start?"      Symptoms started 4 days ago  3  PAIN: "Is there any pain?" If so, ask: "How bad is it?" (Scale: 1-10; mild, moderate, severe)      Back pain rated at 5 / 10     4  CAUSE: "What do you think is causing the symptoms?"      Thinks she has a UTI  5  OTHER SYMPTOMS: "Do you have any other symptoms?" (e g , fever, flank pain, blood in urine, pain with urination)      Temp: 98 7 (forehead)  6  PREGNANCY: "Is there any chance you are pregnant?" "When was your last menstrual period?"      Denies      Protocols used: [facility]

## 2021-01-31 NOTE — PATIENT INSTRUCTIONS

## 2021-01-31 NOTE — TELEPHONE ENCOUNTER
Regarding: UTI  ----- Message from [place], [place] sent at 1/31/2021  9:49 AM EST -----  I'm calling because pt has UTI for 2 days now and she is complaining of pain can someone send medication over to pharmacy  "

## 2021-03-03 ENCOUNTER — OFFICE VISIT (OUTPATIENT)
Dept: FAMILY MEDICINE CLINIC | Facility: HOSPITAL | Age: 41
End: 2021-03-03
Payer: COMMERCIAL

## 2021-03-03 VITALS
WEIGHT: 196.2 LBS | DIASTOLIC BLOOD PRESSURE: 68 MMHG | HEART RATE: 82 BPM | TEMPERATURE: 98.2 F | HEIGHT: 63 IN | OXYGEN SATURATION: 98 % | SYSTOLIC BLOOD PRESSURE: 124 MMHG | BODY MASS INDEX: 34.76 KG/M2

## 2021-03-03 DIAGNOSIS — J01.20 ACUTE NON-RECURRENT ETHMOIDAL SINUSITIS: Primary | ICD-10-CM

## 2021-03-03 DIAGNOSIS — R22.0 SWELLING OF GUMS: ICD-10-CM

## 2021-03-03 PROCEDURE — 1036F TOBACCO NON-USER: CPT | Performed by: NURSE PRACTITIONER

## 2021-03-03 PROCEDURE — 99213 OFFICE O/P EST LOW 20 MIN: CPT | Performed by: NURSE PRACTITIONER

## 2021-03-03 PROCEDURE — 3008F BODY MASS INDEX DOCD: CPT | Performed by: NURSE PRACTITIONER

## 2021-03-03 RX ORDER — LEVOFLOXACIN 500 MG/1
TABLET, FILM COATED ORAL
COMMUNITY
Start: 2021-03-02 | End: 2021-06-11

## 2021-03-03 NOTE — PROGRESS NOTES
Assessment/Plan:   I did not see any swelling of her gums on exam today  I do feel she has a non-bacterial sinusitis  I discussed with pt that a dose of steroids would likely bring her great relief but she is refusing due to AE she has experienced  I also suggested steroid nasal spray and she is refusing that also  I referred to ENT  I do feel she may be having symptoms of TMJ (likely cause of ear pain) and again the prednisone may be beneficial         Diagnoses and all orders for this visit:    Acute non-recurrent ethmoidal sinusitis  -     Ambulatory Referral to Otolaryngology; Future    Swelling of gums    Other orders  -     levofloxacin (LEVAQUIN) 500 mg tablet; take 1 tablet by mouth every 24 hours until finished          Subjective:     Patient ID: Gaurav Maldonado is a 45 y o  female  Swollen gums for 2 months with sinus pain and ear pain  Has been to dentist 3 times  Thought it was sinus infection and took amoxicillin which only helped a little  Dentist prescribed Levofloxacin and she did not start it due to AE  Gums have pressure, not painful  Gets worse with dairy  No sores or ulcers  No sore throat  Located left side upper  (pt points to left upper buccal mucosa)No nasal congestion or runny nose  Same thing happened 3 years ago and was given a paste for the gum which helped  Now with left sided ear pain  No fever or chills  Has been been Sudafed which she feels is getting better  Ibuprofen is also beneficial  Reports that she grinds her teeth at night and noticed after a night of increased grinding the gum swelling started  She states she cannot take prednisone due to high BS and dizziness  States Flonase causes blurry vision  Review of Systems   Constitutional: Negative for chills and fever  HENT: Positive for ear pain, sinus pressure and sinus pain  Negative for congestion, dental problem, mouth sores, postnasal drip, rhinorrhea, sore throat and trouble swallowing           Gum swelling The following portions of the patient's history were reviewed and updated as appropriate: allergies, current medications, past family history, past medical history, past social history, past surgical history and problem list     Objective:  Vitals:    03/03/21 1445   BP: 124/68   Pulse: 82   Temp: 98 2 °F (36 8 °C)   SpO2: 98%      Physical Exam  Vitals signs reviewed  Constitutional:       Appearance: Normal appearance  HENT:      Right Ear: Tympanic membrane, ear canal and external ear normal       Left Ear: Tympanic membrane, ear canal and external ear normal       Nose: Mucosal edema present  Mouth/Throat:      Lips: Pink  Mouth: Mucous membranes are moist  No lacerations, oral lesions or angioedema  Dentition: Normal dentition  No dental tenderness, gingival swelling, dental caries or gum lesions  Tongue: No lesions  Palate: No lesions  Pharynx: Oropharynx is clear  Uvula midline  No pharyngeal swelling, oropharyngeal exudate, posterior oropharyngeal erythema or uvula swelling  Neck:      Musculoskeletal: Normal range of motion and neck supple  Cardiovascular:      Rate and Rhythm: Normal rate and regular rhythm  Heart sounds: Normal heart sounds  No murmur  Pulmonary:      Effort: Pulmonary effort is normal       Breath sounds: Normal breath sounds  Lymphadenopathy:      Cervical: No cervical adenopathy  Skin:     General: Skin is warm and dry  Neurological:      Mental Status: She is alert and oriented to person, place, and time  Psychiatric:         Mood and Affect: Mood normal          Behavior: Behavior normal          Thought Content:  Thought content normal          Judgment: Judgment normal

## 2021-06-09 ENCOUNTER — HOSPITAL ENCOUNTER (EMERGENCY)
Facility: HOSPITAL | Age: 41
Discharge: HOME | End: 2021-06-09
Attending: EMERGENCY MEDICINE | Admitting: EMERGENCY MEDICINE
Payer: COMMERCIAL

## 2021-06-09 ENCOUNTER — APPOINTMENT (EMERGENCY)
Dept: RADIOLOGY | Facility: HOSPITAL | Age: 41
End: 2021-06-09
Attending: EMERGENCY MEDICINE
Payer: COMMERCIAL

## 2021-06-09 ENCOUNTER — APPOINTMENT (EMERGENCY)
Dept: CT IMAGING | Facility: HOSPITAL | Age: 41
End: 2021-06-09
Payer: COMMERCIAL

## 2021-06-09 VITALS
WEIGHT: 197 LBS | OXYGEN SATURATION: 98 % | SYSTOLIC BLOOD PRESSURE: 114 MMHG | TEMPERATURE: 97.7 F | DIASTOLIC BLOOD PRESSURE: 62 MMHG | BODY MASS INDEX: 34.91 KG/M2 | HEART RATE: 87 BPM | HEIGHT: 63 IN | RESPIRATION RATE: 13 BRPM

## 2021-06-09 DIAGNOSIS — R94.31 T WAVE INVERSION IN EKG: ICD-10-CM

## 2021-06-09 DIAGNOSIS — R07.9 CHEST PAIN: ICD-10-CM

## 2021-06-09 DIAGNOSIS — K04.7 DENTAL INFECTION: Primary | ICD-10-CM

## 2021-06-09 LAB
ALBUMIN SERPL BCP-MCNC: 3.7 G/DL (ref 3.5–5)
ALP SERPL-CCNC: 69 U/L (ref 46–116)
ALT SERPL W P-5'-P-CCNC: 25 U/L (ref 12–78)
ANION GAP SERPL CALCULATED.3IONS-SCNC: 8 MMOL/L (ref 4–13)
AST SERPL W P-5'-P-CCNC: 18 U/L (ref 5–45)
BASOPHILS # BLD AUTO: 0.04 THOUSANDS/ΜL (ref 0–0.1)
BASOPHILS NFR BLD AUTO: 1 % (ref 0–1)
BILIRUB SERPL-MCNC: 0.5 MG/DL (ref 0.2–1)
BUN SERPL-MCNC: 8 MG/DL (ref 5–25)
CALCIUM SERPL-MCNC: 9.2 MG/DL (ref 8.3–10.1)
CHLORIDE SERPL-SCNC: 104 MMOL/L (ref 100–108)
CO2 SERPL-SCNC: 30 MMOL/L (ref 21–32)
CREAT SERPL-MCNC: 0.76 MG/DL (ref 0.6–1.3)
EOSINOPHIL # BLD AUTO: 0.09 THOUSAND/ΜL (ref 0–0.61)
EOSINOPHIL NFR BLD AUTO: 1 % (ref 0–6)
ERYTHROCYTE [DISTWIDTH] IN BLOOD BY AUTOMATED COUNT: 12 % (ref 11.6–15.1)
GFR SERPL CREATININE-BSD FRML MDRD: 99 ML/MIN/1.73SQ M
GLUCOSE SERPL-MCNC: 137 MG/DL (ref 65–140)
HCT VFR BLD AUTO: 36 % (ref 34.8–46.1)
HGB BLD-MCNC: 11.8 G/DL (ref 11.5–15.4)
IMM GRANULOCYTES # BLD AUTO: 0.01 THOUSAND/UL (ref 0–0.2)
IMM GRANULOCYTES NFR BLD AUTO: 0 % (ref 0–2)
LYMPHOCYTES # BLD AUTO: 1.13 THOUSANDS/ΜL (ref 0.6–4.47)
LYMPHOCYTES NFR BLD AUTO: 18 % (ref 14–44)
MCH RBC QN AUTO: 29.9 PG (ref 26.8–34.3)
MCHC RBC AUTO-ENTMCNC: 32.8 G/DL (ref 31.4–37.4)
MCV RBC AUTO: 91 FL (ref 82–98)
MONOCYTES # BLD AUTO: 0.42 THOUSAND/ΜL (ref 0.17–1.22)
MONOCYTES NFR BLD AUTO: 7 % (ref 4–12)
NEUTROPHILS # BLD AUTO: 4.65 THOUSANDS/ΜL (ref 1.85–7.62)
NEUTS SEG NFR BLD AUTO: 73 % (ref 43–75)
NRBC BLD AUTO-RTO: 0 /100 WBCS
PLATELET # BLD AUTO: 201 THOUSANDS/UL (ref 149–390)
PMV BLD AUTO: 10.5 FL (ref 8.9–12.7)
POTASSIUM SERPL-SCNC: 3.9 MMOL/L (ref 3.5–5.3)
PROT SERPL-MCNC: 7.4 G/DL (ref 6.4–8.2)
RBC # BLD AUTO: 3.94 MILLION/UL (ref 3.81–5.12)
SODIUM SERPL-SCNC: 142 MMOL/L (ref 136–145)
TROPONIN I SERPL-MCNC: <0.02 NG/ML
WBC # BLD AUTO: 6.34 THOUSAND/UL (ref 4.31–10.16)

## 2021-06-09 PROCEDURE — 85025 COMPLETE CBC W/AUTO DIFF WBC: CPT | Performed by: EMERGENCY MEDICINE

## 2021-06-09 PROCEDURE — 70487 CT MAXILLOFACIAL W/DYE: CPT

## 2021-06-09 PROCEDURE — 36415 COLL VENOUS BLD VENIPUNCTURE: CPT | Performed by: EMERGENCY MEDICINE

## 2021-06-09 PROCEDURE — 96374 THER/PROPH/DIAG INJ IV PUSH: CPT

## 2021-06-09 PROCEDURE — 71045 X-RAY EXAM CHEST 1 VIEW: CPT

## 2021-06-09 PROCEDURE — 93005 ELECTROCARDIOGRAM TRACING: CPT

## 2021-06-09 PROCEDURE — 96361 HYDRATE IV INFUSION ADD-ON: CPT

## 2021-06-09 PROCEDURE — 99285 EMERGENCY DEPT VISIT HI MDM: CPT | Performed by: EMERGENCY MEDICINE

## 2021-06-09 PROCEDURE — 99284 EMERGENCY DEPT VISIT MOD MDM: CPT

## 2021-06-09 PROCEDURE — 84484 ASSAY OF TROPONIN QUANT: CPT | Performed by: EMERGENCY MEDICINE

## 2021-06-09 PROCEDURE — G1004 CDSM NDSC: HCPCS

## 2021-06-09 PROCEDURE — 80053 COMPREHEN METABOLIC PANEL: CPT | Performed by: EMERGENCY MEDICINE

## 2021-06-09 RX ORDER — METRONIDAZOLE 500 MG/1
500 TABLET ORAL ONCE
Status: COMPLETED | OUTPATIENT
Start: 2021-06-09 | End: 2021-06-09

## 2021-06-09 RX ORDER — KETOROLAC TROMETHAMINE 30 MG/ML
15 INJECTION, SOLUTION INTRAMUSCULAR; INTRAVENOUS ONCE
Status: COMPLETED | OUTPATIENT
Start: 2021-06-09 | End: 2021-06-09

## 2021-06-09 RX ORDER — METRONIDAZOLE 500 MG/1
500 TABLET ORAL EVERY 8 HOURS SCHEDULED
Qty: 12 TABLET | Refills: 0 | Status: SHIPPED | OUTPATIENT
Start: 2021-06-09 | End: 2021-06-13

## 2021-06-09 RX ADMIN — METRONIDAZOLE 500 MG: 500 TABLET ORAL at 11:28

## 2021-06-09 RX ADMIN — KETOROLAC TROMETHAMINE 15 MG: 30 INJECTION, SOLUTION INTRAMUSCULAR; INTRAVENOUS at 10:14

## 2021-06-09 RX ADMIN — SODIUM CHLORIDE 500 ML: 0.9 INJECTION, SOLUTION INTRAVENOUS at 10:19

## 2021-06-09 RX ADMIN — IOHEXOL 100 ML: 350 INJECTION, SOLUTION INTRAVENOUS at 11:04

## 2021-06-09 NOTE — Clinical Note
Eboni Bradley was seen and treated in our emergency department on 6/9/2021  Diagnosis: dental infection    Ish Wang    She may return on this date: 06/10/2021         If you have any questions or concerns, please don't hesitate to call        Pantera Sharma DO    ______________________________           _______________          _______________  Hospital Representative                              Date                                Time

## 2021-06-09 NOTE — DISCHARGE INSTRUCTIONS
Please go to your appointment with the dentist at 12:30pm today and keep tomorrow's appointment for the root canal/extraction  Please take the antibiotics as prescribed- amoxicillin 875 mg (2 pills in the morning, and 1 pill at nightime) and flagyl 500mg every 8 hours  Please return to the emergency department for the following, but not limited to worsening facial swelling, swelling in the front part of the neck, difficulty/inability to swallow, swelling/ redness around the eye, fevers  In regards to the chest pain- please follow up with your primary care doctor and cardiology in the outpatient setting  Please return to the emergency department for the following, but not limited to chest pain, shortness of breath, lightheadedness, dizziness, palpitations, swelling in your lower legs

## 2021-06-09 NOTE — ED PROVIDER NOTES
History  Chief Complaint   Patient presents with    Dental Pain     Shawn states that she has an absess on her molar      Patient is a 45year old who presents with left sided facial and tooth pain  Patient reports that she was seen by her dentist on 6/7/2021 (Monday), had drainage of dental abscess, was started on 875 mg PO amoxicillin, and has an appointment with OM for root canal and assessment tomorrow, who today presents for left jaw swelling and pain  Patient reports that the pain in the left tooth (#19) has been constant, throbbing, radiating to the left jaw/cheek/left side of the head/left side of the neck, moderate in intensity  She also notes 2 days of left sided chest pain, that she is unsure whether is radiation from the left jaw that she describes as constant since it started, throbbing, moderate in intensity without any associated shortness of breath, lightheadedness, palpitations  Denies fevers, chills, drooling, anterior neck swelling, changes in vision, neck stiffness  Patient called her dentist at [address] [place] [address] and was directed to go to the ER for worsening symptoms  I spoke with the patient's dentist from [address] [place] [address] on the patient's cell phone- the dentist was considered about significant spread based on description that was provided over the telephone  Relayed current exam findings to the patient's dentist and plan of care was developed that the patient will get CT imaging of the face, will be given additional flagyl and continue to take the amoxicillin  Prior to Admission Medications   Prescriptions Last Dose Informant Patient Reported? Taking?    ELISSA MICROLET LANCETS lancets   No No   Sig: TEST once daily   fluticasone (FLONASE) 50 mcg/act nasal spray   No No   Sig: instill 2 sprays into each nostril once daily   glucose blood (ELISSA CONTOUR TEST) test strip   No No   Sig: TEST once daily   levofloxacin (LEVAQUIN) 500 mg tablet   Yes No   Sig: take 1 tablet by mouth every 24 hours until finished   multivitamin (THERAGRAN) TABS   Yes No   Sig: Take 1 tablet by mouth daily  omeprazole (PriLOSEC) 10 mg delayed release capsule   Yes No   Sig: Take 10 mg by mouth daily   ranitidine (ZANTAC) 150 mg tablet   Yes No   Sig: Take 1 tablet by mouth as needed      Facility-Administered Medications: None       Past Medical History:   Diagnosis Date    Carpal tunnel syndrome, unspecified upper limb     Cholelithiasis     Last assessed 1/7/2014     Chronic constipation     Last assessed 4/1/2017     Dental disorder     Last assessed 1/28/2016     Dysuria     Resolved 8/11/2015     Fibromyalgia     Hypoglycemia     Numbness and tingling of left side of face     Last assessed 12/11/2017     Numbness and tingling of left upper extremity     Last assessed 12/11/2017     Numbness of left lower extremity     Last assessed 12/11/2017     Right facial numbness     Resolved 11/6/2017     Tingling     Last assessed 88/54/3891     Umbilical hernia        Past Surgical History:   Procedure Laterality Date    CHOLECYSTECTOMY      HERNIA REPAIR  86/20/3001    Umbilical hernia repair  Managed by: Rae Howell (General Surgery)     LAPAROSCOPIC CHOLECYSTECTOMY  01/16/2014    Managed by: Rae Howell (General Surgery)     TUBAL LIGATION         Family History   Problem Relation Age of Onset    Anemia Mother     Hypertension Mother     Diabetes type II Mother     Diabetes type II Father     Arthritis Family     Coronary artery disease Family     Diabetes Family     Hyperlipidemia Family     Osteoporosis Family      I have reviewed and agree with the history as documented      E-Cigarette/Vaping    E-Cigarette Use Never User      E-Cigarette/Vaping Substances     Social History     Tobacco Use    Smoking status: Never Smoker    Smokeless tobacco: Never Used   Substance Use Topics    Alcohol use: No    Drug use: No       Review of Systems   Constitutional: Negative for chills and fever  HENT: Positive for dental problem and facial swelling  Negative for congestion, drooling, rhinorrhea, trouble swallowing and voice change  Eyes: Negative for photophobia and visual disturbance  Respiratory: Negative for cough, chest tightness and shortness of breath  Cardiovascular: Positive for chest pain  Negative for palpitations and leg swelling  Gastrointestinal: Negative for abdominal pain, constipation, diarrhea, nausea and vomiting  Genitourinary: Negative for dysuria, flank pain and hematuria  Musculoskeletal: Negative for back pain and neck pain  Skin: Negative for color change and pallor  Neurological: Positive for headaches  Negative for dizziness, weakness, light-headedness and numbness  Physical Exam  Physical Exam  Vitals signs and nursing note reviewed  Constitutional:       General: She is not in acute distress  Appearance: Normal appearance  She is not ill-appearing, toxic-appearing or diaphoretic  HENT:      Head: Normocephalic and atraumatic  Jaw: Trismus (mild- secondary to pain from the tooth and swelling of jaw/cheek), tenderness and swelling present  Mouth/Throat:      Mouth: Mucous membranes are moist       Dentition: Dental tenderness and dental caries present  No gingival swelling  Pharynx: Oropharynx is clear  Comments: Voice is clear  Eyes:      Extraocular Movements: Extraocular movements intact  Conjunctiva/sclera: Conjunctivae normal       Pupils: Pupils are equal, round, and reactive to light  Neck:      Musculoskeletal: Neck supple  Normal range of motion  No edema, erythema, neck rigidity, crepitus, injury, pain with movement, torticollis, spinous process tenderness or muscular tenderness  Trachea: Trachea and phonation normal  No tracheal tenderness  Cardiovascular:      Rate and Rhythm: Normal rate and regular rhythm  Pulses: Normal pulses        Heart sounds: Normal heart sounds  No murmur  Pulmonary:      Effort: Pulmonary effort is normal  No respiratory distress  Breath sounds: Normal breath sounds  No stridor  No wheezing, rhonchi or rales  Chest:      Chest wall: No tenderness  Abdominal:      General: Bowel sounds are normal  There is no distension  Palpations: Abdomen is soft  Tenderness: There is no abdominal tenderness  There is no guarding or rebound  Musculoskeletal:      Right lower leg: No edema  Left lower leg: No edema  Lymphadenopathy:      Cervical: No cervical adenopathy  Skin:     General: Skin is warm and dry  Findings: No erythema  Neurological:      General: No focal deficit present  Mental Status: She is alert and oriented to person, place, and time  Mental status is at baseline     Psychiatric:         Mood and Affect: Mood normal          Behavior: Behavior normal          Vital Signs  ED Triage Vitals   Temperature Pulse Respirations Blood Pressure SpO2   06/09/21 0941 06/09/21 0942 06/09/21 0942 06/09/21 0942 06/09/21 0942   97 7 °F (36 5 °C) 98 18 125/64 97 %      Temp Source Heart Rate Source Patient Position - Orthostatic VS BP Location FiO2 (%)   06/09/21 0941 06/09/21 1106 06/09/21 0942 06/09/21 0942 --   Temporal Monitor Lying Right arm       Pain Score       06/09/21 1014       5           Vitals:    06/09/21 0945 06/09/21 1106 06/09/21 1115 06/09/21 1130   BP: 125/64 116/58 116/58 114/62   Pulse: 95 91 84 87   Patient Position - Orthostatic VS: Lying Lying           Visual Acuity      ED Medications  Medications   ketorolac (TORADOL) injection 15 mg (15 mg Intravenous Given 6/9/21 1014)   sodium chloride 0 9 % bolus 500 mL (0 mL Intravenous Stopped 6/9/21 1119)   iohexol (OMNIPAQUE) 350 MG/ML injection (SINGLE-DOSE) 100 mL (100 mL Intravenous Given 6/9/21 1104)   metroNIDAZOLE (FLAGYL) tablet 500 mg (500 mg Oral Given 6/9/21 1128)       Diagnostic Studies  Results Reviewed     Procedure Component Value Units Date/Time    Troponin I [[id number]]  (Normal) Collected: 06/09/21 1013    Lab Status: Final result Specimen: Blood from Arm, Right Updated: 06/09/21 1042     Troponin I <0 02 ng/mL     Comprehensive metabolic panel [[id number]] Collected: 06/09/21 1013    Lab Status: Final result Specimen: Blood from Arm, Right Updated: 06/09/21 1040     Sodium 142 mmol/L      Potassium 3 9 mmol/L      Chloride 104 mmol/L      CO2 30 mmol/L      ANION GAP 8 mmol/L      BUN 8 mg/dL      Creatinine 0 76 mg/dL      Glucose 137 mg/dL      Calcium 9 2 mg/dL      AST 18 U/L      ALT 25 U/L      Alkaline Phosphatase 69 U/L      Total Protein 7 4 g/dL      Albumin 3 7 g/dL      Total Bilirubin 0 50 mg/dL      eGFR 99 ml/min/1 73sq m     Narrative:      [place] guidelines for Chronic Kidney Disease (CKD):     Stage 1 with normal or high GFR (GFR > 90 mL/min/1 73 square meters)    Stage 2 Mild CKD (GFR = 60-89 mL/min/1 73 square meters)    Stage 3A Moderate CKD (GFR = 45-59 mL/min/1 73 square meters)    Stage 3B Moderate CKD (GFR = 30-44 mL/min/1 73 square meters)    Stage 4 Severe CKD (GFR = 15-29 mL/min/1 73 square meters)    Stage 5 End Stage CKD (GFR <15 mL/min/1 73 square meters)  Note: GFR calculation is accurate only with a steady state creatinine    CBC and differential [[id number]] Collected: 06/09/21 1013    Lab Status: Final result Specimen: Blood from Arm, Right Updated: 06/09/21 1027     WBC 6 34 Thousand/uL      RBC 3 94 Million/uL      Hemoglobin 11 8 g/dL      Hematocrit 36 0 %      MCV 91 fL      MCH 29 9 pg      MCHC 32 8 g/dL      RDW 12 0 %      MPV 10 5 fL      Platelets 387 Thousands/uL      nRBC 0 /100 WBCs      Neutrophils Relative 73 %      Immat GRANS % 0 %      Lymphocytes Relative 18 %      Monocytes Relative 7 %      Eosinophils Relative 1 %      Basophils Relative 1 %      Neutrophils Absolute 4 65 Thousands/µL      Immature Grans Absolute 0 01 Thousand/uL      Lymphocytes Absolute 1 13 Thousands/µL      Monocytes Absolute 0 42 Thousand/µL      Eosinophils Absolute 0 09 Thousand/µL      Basophils Absolute 0 04 Thousands/µL                  CT facial bones with contrast   Final Result by Andrea Rodriguez DO (06/09 1201)   1  Somewhat limited examination due to beam hardening artifact from dental hardware  2   Periapical lucencies surrounding the lower left 1st molar (tooth #19)  There is overlying soft tissue swelling, consistent with phlegmonous changes and mild cellulitis  No discrete subperiosteal abscess is seen  Given the patient's history, consider follow-up OMS consultation               I personally discussed this study with Eloy Romberg on 6/9/2021 at 11:55 AM                Workstation performed: [id number]         XR chest 1 view portable   ED Interpretation by Nikita Watts DO (06/09 3106)   No acute abnormalities as interpreted by me independently                  Procedures  ECG 12 Lead Documentation Only    Date/Time: 6/9/2021 12:29 PM  Performed by: Nikita Watts DO  Authorized by: Nikita Watts DO     Indications / Diagnosis:  Cp  ECG reviewed by me, the ED Provider: yes    Patient location:  ED  Previous ECG:     Previous ECG:  Compared to current    Comparison ECG info:  5/26/2019    Similarity:  Changes noted  Interpretation:     Interpretation: non-specific    Rate:     ECG rate:  82    ECG rate assessment: normal    Rhythm:     Rhythm: sinus rhythm    Ectopy:     Ectopy: none    QRS:     QRS axis:  Normal    QRS intervals:  Normal  Conduction:     Conduction: normal    ST segments:     ST segments:  Normal  T waves:     T waves: inverted      Inverted:  AVL  Comments:                   ED Course  ED Course as of Jun 09 1237   Wed Jun 09, 2021   1122 Patient was able to secure a follow up appointment with her dentist at [address] [place] [address] for 12:30pm today for reassessment as they saw her on Monday, repeat drainage, to bridge her to the [place] [facility] [place]  HEART Risk Score      Most Recent Value   Heart Score Risk Calculator   History  0 Filed at: 06/09/2021 1136   ECG  1 Filed at: 06/09/2021 1136   Age  0 Filed at: 06/09/2021 1136   Risk Factors  0 Filed at: 06/09/2021 1136   Troponin  0 Filed at: 06/09/2021 1136   HEART Score  1 Filed at: 06/09/2021 1136                      SBIRT 22yo+      Most Recent Value   SBIRT (23 yo +)   In order to provide better care to our patients, we are screening all of our patients for alcohol and drug use  Would it be okay to ask you these screening questions? No Filed at: 06/09/2021 1015                    MDM  Number of Diagnoses or Management Options  Chest pain:   Dental infection:   T wave inversion in EKG:   Diagnosis management comments: Assessment and Plan:   45year old F presenting with left sided tooth pain with radiation to surrounding structures  On exam, patient has mild swelling to the left jaw and cheek  No signs of ludwigs  The upper left chest pain may be radiation from the tooth pain, but will get cardiac workup for assessment for ACS  Treat with toradol for pain relief  Get CT facial bones with contrast to assess for abscess  Discussed CT imaging with the patient which showed some soft tissue swelling consistent with a mild cellulitis but without a discrete abscess  Also discussed with the patient and her  regarding the fact that the left-sided pain that she is feeling in her face and chest may be related to current infection/cellulitis, but that she has a nonspecific EKG and that she should follow-up with her primary care doctor for further evaluation  Also given strict precautions/discharge instructions including chest pain with associated palpitations, lightheadedness, dizziness, shortness of breath, lower extremity swelling  Patient stated that she will follow-up with her dentist at 12:30 p m   Today and keep the appointment for on the fast tomorrow  She understands the discharge instructions and return precautions for both the facial cellulitis as well as the chest pain  Disposition  Final diagnoses:   Dental infection   Chest pain   T wave inversion in EKG     Time reflects when diagnosis was documented in both MDM as applicable and the Disposition within this note     Time User Action Codes Description Comment    6/9/2021 11:26 AM Wyvonne Pamella Add [K04 7] Dental infection     6/9/2021 11:26 AM Wyvonne Pamella Add [R07 9] Chest pain     6/9/2021 11:34 AM Wyvonne Pamella Add [R94 31] T wave inversion in EKG       ED Disposition     ED Disposition Condition Date/Time Comment    Discharge Stable Wed Jun 9, 2021 11:26 AM Pradip Patel discharge to home/self care              Follow-up Information     Follow up With Specialties Details Why Contact Info Additional Information    Mustapha Silva MD Family Medicine Schedule an appointment as soon as possible for a visit in 3 days for re-evaluation [id number]  [phone]        [address] Emergency Department Emergency Medicine Go to  As needed, If symptoms worsen, for re-evaluation [address] [place], [phone]  [address] [facility], [address] [facility] 10    [facility] Cardiology [facility] Cardiology Schedule an appointment as soon as possible for a visit in 1 week for re-evaluation [address] [facility] [phone]  [address] Cardiology [facility], [address] [facility]          Discharge Medication List as of 6/9/2021 11:55 AM      START taking these medications    Details   metroNIDAZOLE (FLAGYL) 500 mg tablet Take 1 tablet (500 mg total) by mouth every 8 (eight) hours for 4 days, Starting BROOKE Farris 6/9/2021, Until Sun 6/13/2021, Normal         CONTINUE these medications which have NOT CHANGED    Details   ELISSA MICROLET LANCETS lancets TEST once daily, Normal      fluticasone (FLONASE) 50 mcg/act nasal spray instill 2 sprays into each nostril once daily, Normal      glucose blood (ELISSA CONTOUR TEST) test strip TEST once daily, Normal      levofloxacin (LEVAQUIN) 500 mg tablet take 1 tablet by mouth every 24 hours until finished, Historical Med      multivitamin (THERAGRAN) TABS Take 1 tablet by mouth daily  , Until Discontinued, Historical Med      omeprazole (PriLOSEC) 10 mg delayed release capsule Take 10 mg by mouth daily, Historical Med      ranitidine (ZANTAC) 150 mg tablet Take 1 tablet by mouth as needed, Starting Mon 12/16/2013, Historical Med           No discharge procedures on file      PDMP Review     None          ED Provider  Electronically Signed by           Carla Wood DO  06/09/21 1954

## 2021-06-10 ENCOUNTER — NURSE TRIAGE (OUTPATIENT)
Dept: OTHER | Facility: OTHER | Age: 41
End: 2021-06-10

## 2021-06-10 NOTE — TELEPHONE ENCOUNTER
Regarding: Green urine / on two antibiotics / care advice  ----- Message from [facility] DAMARI sent at 6/10/2021  6:05 PM EDT -----  "I am just wondering if this is normal  My mom has a tooth abscess and was prescribed amoxicillin and then the emergency room prescribed her metronidazole   Is it supposed to or can it change her urine green?"

## 2021-06-10 NOTE — TELEPHONE ENCOUNTER
Answer Assessment - Initial Assessment Questions  1  SYMPTOM: "What's the main symptom you're concerned about?" (e g , frequency, incontinence)      Green urine  2  ONSET: "When did the  Green urine  start?"      She is is being treated for tooth abcess  3  PAIN: "Is there any pain?" If so, ask: "How bad is it?" (Scale: 1-10; mild, moderate, severe)        4  CAUSE: "What do you think is causing the symptoms?"      Started on Amoxicillin and Metronidazole  5   OTHER SYMPTOMS: "Do you have any other symptoms?" (e g , fever, flank pain, blood in urine, pain with urination)      Green urine    Protocols used: [facility]

## 2021-06-11 ENCOUNTER — OFFICE VISIT (OUTPATIENT)
Dept: FAMILY MEDICINE CLINIC | Facility: HOSPITAL | Age: 41
End: 2021-06-11
Payer: COMMERCIAL

## 2021-06-11 VITALS
HEIGHT: 63 IN | DIASTOLIC BLOOD PRESSURE: 82 MMHG | SYSTOLIC BLOOD PRESSURE: 130 MMHG | WEIGHT: 196 LBS | TEMPERATURE: 98.8 F | HEART RATE: 80 BPM | BODY MASS INDEX: 34.73 KG/M2

## 2021-06-11 DIAGNOSIS — K06.8 PAIN IN GUMS: ICD-10-CM

## 2021-06-11 DIAGNOSIS — R82.90 URINE ABNORMALITY: ICD-10-CM

## 2021-06-11 DIAGNOSIS — K04.7 DENTAL ABSCESS: Primary | ICD-10-CM

## 2021-06-11 LAB
ATRIAL RATE: 82 BPM
P AXIS: 58 DEGREES
PR INTERVAL: 150 MS
QRS AXIS: 26 DEGREES
QRSD INTERVAL: 76 MS
QT INTERVAL: 378 MS
QTC INTERVAL: 441 MS
T WAVE AXIS: 80 DEGREES
VENTRICULAR RATE: 82 BPM

## 2021-06-11 PROCEDURE — 1036F TOBACCO NON-USER: CPT | Performed by: FAMILY MEDICINE

## 2021-06-11 PROCEDURE — 3008F BODY MASS INDEX DOCD: CPT | Performed by: FAMILY MEDICINE

## 2021-06-11 PROCEDURE — 93010 ELECTROCARDIOGRAM REPORT: CPT | Performed by: INTERNAL MEDICINE

## 2021-06-11 PROCEDURE — 99214 OFFICE O/P EST MOD 30 MIN: CPT | Performed by: FAMILY MEDICINE

## 2021-06-11 RX ORDER — CLINDAMYCIN HYDROCHLORIDE 300 MG/1
300 CAPSULE ORAL 4 TIMES DAILY
Qty: 28 CAPSULE | Refills: 0 | Status: SHIPPED | OUTPATIENT
Start: 2021-06-11 | End: 2021-06-18

## 2021-06-11 RX ORDER — AMOXICILLIN 875 MG/1
TABLET, COATED ORAL
COMMUNITY
Start: 2021-06-07 | End: 2021-06-28 | Stop reason: ALTCHOICE

## 2021-06-11 NOTE — LETTER
June 11, 2021     Patient: Keith Pickard   YOB: 1980   Date of Visit: 6/11/2021       To Whom it May Concern:    Keith Pickard is under my professional care  She was seen in my office on 6/11/2021  She may return to work on 6/15/2021  Please excuse her from work 6/11 to 6/14/2021              Sincerely,          Manjinder Garcia MD        CC: No Recipients

## 2021-06-14 NOTE — PROGRESS NOTES
Assessment/Plan:      Problem List Items Addressed This Visit     None      Visit Diagnoses     Dental abscess    -  Primary    Relevant Medications    clindamycin (CLEOCIN) 300 MG capsule    Pain in gums        Urine abnormality               Plan/Discussion:  With ongoing dental pain with abscess  S/p root canal    No significant improvement with augmentin and flagyl    flgyl likely cause of discolored urine  UA in the office shows normal urine  Referral made to OMS today  Called office for ASAP apt  They will see her Monday to further evaluate  Change augmentin and flagyl  Start clindamycin 300 mg QID at least until seen by OMS  Subjective:   Chief Complaint   Patient presents with    Facial Swelling     Left side - has tooth abcess         Patient ID: Leah Christensen is a 45 y o  female  Pt called [facility] last night  Had green urine  No green urine today  Has been dealing with dental work and dental abscess  Had root canal done  Has been working with general dentistry  Has not seen OMS yet  Currently on augmentin and then flagyl added on through the Er  No fever, no chills  With pain in the mouth  Pain on chewing  No fever,no chills  No change in mentation  The following portions of the patient's history were reviewed and updated as appropriate: allergies, current medications, past family history, past medical history, past social history, past surgical history and problem list     Review of Systems   Constitutional: Negative  Negative for activity change, appetite change, chills, diaphoresis, fatigue and fever  HENT: Negative for congestion, facial swelling and sore throat  Respiratory: Negative  Negative for apnea, cough, chest tightness and shortness of breath  Cardiovascular: Negative  Negative for chest pain and palpitations  Gastrointestinal: Negative    Negative for abdominal distention, abdominal pain, blood in stool, constipation, diarrhea and nausea  Genitourinary: Negative  Negative for difficulty urinating, dysuria, flank pain and frequency  Objective:  Vitals:    06/11/21 1248   BP: 130/82   Pulse: 80   Temp: 98 8 °F (37 1 °C)   Weight: 88 9 kg (196 lb)   Height: 5' 3" (1 6 m)     BP Readings from Last 6 Encounters:   06/11/21 130/82   06/09/21 114/62   03/03/21 124/68   01/31/21 132/78   01/29/20 128/80   09/24/19 126/74      Wt Readings from Last 6 Encounters:   06/11/21 88 9 kg (196 lb)   06/09/21 89 4 kg (197 lb)   03/12/21 88 9 kg (196 lb)   03/03/21 89 kg (196 lb 3 2 oz)   01/31/21 87 6 kg (193 lb 3 2 oz)   01/29/20 88 kg (194 lb)             Physical Exam  Vitals and nursing note reviewed  Constitutional:       Appearance: Normal appearance  She is ill-appearing  HENT:      Head: Normocephalic  Jaw: Tenderness and pain on movement present  No trismus  Salivary Glands: Right salivary gland is not diffusely enlarged or tender  Left salivary gland is not diffusely enlarged or tender  Mouth/Throat:      Lips: Pink  Mouth: Mucous membranes are moist       Dentition: Abnormal dentition  Dental tenderness present  No dental abscesses  Tongue: No lesions  Palate: No mass and lesions  Pharynx: Oropharynx is clear  Uvula midline  Neurological:      Mental Status: She is alert

## 2021-06-25 ENCOUNTER — TELEPHONE (OUTPATIENT)
Dept: FAMILY MEDICINE CLINIC | Facility: HOSPITAL | Age: 41
End: 2021-06-25

## 2021-06-25 NOTE — TELEPHONE ENCOUNTER
Pt said they were put on clindomyacin (is this right, I only see amoxicillin in med list?)    She finished about 5 days ago but she is still unwell and having diarrhea  Ni Baum wonders if this is normal, a possible side effect from an antibiotic? Or shes just still sick?     Please advise

## 2021-06-25 NOTE — TELEPHONE ENCOUNTER
Please call  Likely due to clindamycin  If she continues with diarrhea on Monday will need to test for c diff  Continue with good hydration  Probiotics suc h as florastro 250 mg daily ( otc) may help  Let me know on Monday

## 2021-06-25 NOTE — TELEPHONE ENCOUNTER
Spoke to [place]  Diarrhea and very tired and after sleeping a full night  Finished clindamycin  Dental abscess - still has a "ball" on her cheek and following up with dentist on Monday  Diarrhea started towards the end of the course of the antibiotic    3 times in the morning  Loose  No blood in stool  No fevers  Has not tried anything over the counter  Please advise

## 2021-06-28 ENCOUNTER — OFFICE VISIT (OUTPATIENT)
Dept: FAMILY MEDICINE CLINIC | Facility: HOSPITAL | Age: 41
End: 2021-06-28
Payer: COMMERCIAL

## 2021-06-28 ENCOUNTER — HOSPITAL ENCOUNTER (OUTPATIENT)
Dept: CT IMAGING | Facility: HOSPITAL | Age: 41
Discharge: HOME | End: 2021-06-28
Payer: COMMERCIAL

## 2021-06-28 VITALS
HEART RATE: 78 BPM | SYSTOLIC BLOOD PRESSURE: 118 MMHG | HEIGHT: 63 IN | WEIGHT: 197.2 LBS | DIASTOLIC BLOOD PRESSURE: 70 MMHG | TEMPERATURE: 97.5 F | BODY MASS INDEX: 34.94 KG/M2

## 2021-06-28 DIAGNOSIS — M26.9 DENTOFACIAL ANOMALY, UNSPECIFIED: ICD-10-CM

## 2021-06-28 DIAGNOSIS — K04.7 DENTAL ABSCESS: ICD-10-CM

## 2021-06-28 DIAGNOSIS — K06.8 PAIN IN GUMS: ICD-10-CM

## 2021-06-28 DIAGNOSIS — L03.811 CELLULITIS OF HEAD EXCEPT FACE: ICD-10-CM

## 2021-06-28 DIAGNOSIS — K04.7 DENTAL ABSCESS: Primary | ICD-10-CM

## 2021-06-28 DIAGNOSIS — N62 LARGE BREASTS: ICD-10-CM

## 2021-06-28 PROCEDURE — 3008F BODY MASS INDEX DOCD: CPT | Performed by: FAMILY MEDICINE

## 2021-06-28 PROCEDURE — 70486 CT MAXILLOFACIAL W/O DYE: CPT

## 2021-06-28 PROCEDURE — 99214 OFFICE O/P EST MOD 30 MIN: CPT | Performed by: FAMILY MEDICINE

## 2021-06-28 PROCEDURE — 1036F TOBACCO NON-USER: CPT | Performed by: FAMILY MEDICINE

## 2021-06-28 PROCEDURE — G1004 CDSM NDSC: HCPCS

## 2021-06-28 RX ORDER — TRIAMCINOLONE ACETONIDE 0.1 %
1 PASTE (GRAM) DENTAL 3 TIMES DAILY
Qty: 5 G | Refills: 1 | Status: SHIPPED | OUTPATIENT
Start: 2021-06-28

## 2021-06-28 NOTE — PROGRESS NOTES
Assessment/Plan:      Problem List Items Addressed This Visit     None      Visit Diagnoses     Dental abscess    -  Primary    Relevant Medications    triamcinolone (KENALOG) 0 1 % oral topical paste    Other Relevant Orders    CBC    Comprehensive metabolic panel    C-reactive protein    CT facial bones wo contrast    Pain in gums        Relevant Medications    triamcinolone (KENALOG) 0 1 % oral topical paste    Cellulitis of head except face        Dentofacial anomaly, unspecified        Large breasts        Relevant Orders    Ambulatory referral to Plastic Surgery           Plan/Discussion:    Patient with ongoing pain in the left  mandibular area  S/p root canal    Has been seeing OMS but no active intervention  Continues with pain  Pain also noted in the TMJ area  Pain in the neck and shoulders  With ongoing fatigue, weakness  Reviewed previous Ct scan  Repeat ct scan of face  reeval for abscess, reeval for cellulitis  Consider fu /opinion through ENT or other OMS  Kenalog given topicall for pain in the gums  Referral to plastics due to large breasts associated with upper back and neck pain  Subjective:   Chief Complaint   Patient presents with    Jaw Pain        Patient ID: Nidhi Coy is a 45 y o  female  Pt here for fu  Has had dental abscess  Was being seen by dental medicine then by OMS  Ct scan was done  Root canal and drainage done by dentla medicine ( from my understanding but no records available)  She continues with pain on the left side of the jaw  Radiates to the TMJ area  Difficult to chew  Associated pain in the neck and shoulders  Has had increased fatigue, weakness  No fever, no chills                 The following portions of the patient's history were reviewed and updated as appropriate: allergies, current medications, past family history, past medical history, past social history, past surgical history and problem list     Review of Systems   Constitutional: Positive for activity change, appetite change and fatigue  Negative for fever and unexpected weight change  Respiratory: Negative for cough, choking and shortness of breath  Neurological: Positive for headaches  Objective:  Vitals:    06/28/21 1429   BP: 118/70   Pulse: 78   Temp: 97 5 °F (36 4 °C)   Weight: 89 4 kg (197 lb 3 2 oz)   Height: 5' 3" (1 6 m)     BP Readings from Last 6 Encounters:   06/28/21 118/70   06/11/21 130/82   06/09/21 114/62   03/03/21 124/68   01/31/21 132/78   01/29/20 128/80      Wt Readings from Last 6 Encounters:   06/28/21 89 4 kg (197 lb 3 2 oz)   06/11/21 88 9 kg (196 lb)   06/09/21 89 4 kg (197 lb)   03/12/21 88 9 kg (196 lb)   03/03/21 89 kg (196 lb 3 2 oz)   01/31/21 87 6 kg (193 lb 3 2 oz)             Physical Exam  Vitals and nursing note reviewed  Constitutional:       Appearance: Normal appearance  She is ill-appearing  HENT:      Head: Normocephalic  Jaw: Tenderness and pain on movement present  No trismus, swelling or malocclusion  Salivary Glands: Right salivary gland is not diffusely enlarged or tender  Left salivary gland is not diffusely enlarged or tender  Comments: Pain over the left midmandibular area on palpation  Note of swelling over the gum area  ttp over the TMJ, left as well  Mouth/Throat:      Lips: Pink  Mouth: Mucous membranes are moist       Dentition: Abnormal dentition  Tongue: No lesions  Tongue does not deviate from midline  Neurological:      Mental Status: She is alert

## 2021-06-30 ENCOUNTER — TELEPHONE (OUTPATIENT)
Dept: FAMILY MEDICINE CLINIC | Facility: HOSPITAL | Age: 41
End: 2021-06-30

## 2021-06-30 LAB
ALBUMIN SERPL-MCNC: 4.3 G/DL (ref 3.8–4.8)
ALBUMIN/GLOB SERPL: 2 {RATIO} (ref 1.2–2.2)
ALP SERPL-CCNC: 64 IU/L (ref 48–121)
ALT SERPL-CCNC: 17 IU/L (ref 0–32)
AST SERPL-CCNC: 16 IU/L (ref 0–40)
BILIRUB SERPL-MCNC: 0.7 MG/DL (ref 0–1.2)
BUN SERPL-MCNC: 9 MG/DL (ref 6–20)
BUN/CREAT SERPL: 13 (ref 9–23)
CALCIUM SERPL-MCNC: 9.4 MG/DL (ref 8.7–10.2)
CHLORIDE SERPL-SCNC: 103 MMOL/L (ref 96–106)
CO2 SERPL-SCNC: 26 MMOL/L (ref 20–29)
CREAT SERPL-MCNC: 0.72 MG/DL (ref 0.57–1)
CRP SERPL-MCNC: 3 MG/L (ref 0–10)
ERYTHROCYTE [DISTWIDTH] IN BLOOD BY AUTOMATED COUNT: 12.2 % (ref 11.7–15.4)
GLOBULIN SER-MCNC: 2.1 G/DL (ref 1.5–4.5)
GLUCOSE SERPL-MCNC: 106 MG/DL (ref 65–99)
HCT VFR BLD AUTO: 37 % (ref 34–46.6)
HGB BLD-MCNC: 12.1 G/DL (ref 11.1–15.9)
MCH RBC QN AUTO: 29.4 PG (ref 26.6–33)
MCHC RBC AUTO-ENTMCNC: 32.7 G/DL (ref 31.5–35.7)
MCV RBC AUTO: 90 FL (ref 79–97)
PLATELET # BLD AUTO: 229 X10E3/UL (ref 150–450)
POTASSIUM SERPL-SCNC: 4 MMOL/L (ref 3.5–5.2)
PROT SERPL-MCNC: 6.4 G/DL (ref 6–8.5)
RBC # BLD AUTO: 4.12 X10E6/UL (ref 3.77–5.28)
SL AMB EGFR AFRICAN AMERICAN: 122 ML/MIN/1.73
SL AMB EGFR NON AFRICAN AMERICAN: 106 ML/MIN/1.73
SODIUM SERPL-SCNC: 140 MMOL/L (ref 134–144)
WBC # BLD AUTO: 6.3 X10E3/UL (ref 3.4–10.8)

## 2021-07-01 DIAGNOSIS — M62.838 NECK MUSCLE SPASM: Primary | ICD-10-CM

## 2021-07-01 RX ORDER — BACLOFEN 10 MG/1
10 TABLET ORAL 3 TIMES DAILY
Qty: 15 TABLET | Refills: 0 | Status: SHIPPED | OUTPATIENT
Start: 2021-07-01 | End: 2022-02-03 | Stop reason: ALTCHOICE

## 2021-12-07 ENCOUNTER — TELEPHONE (OUTPATIENT)
Dept: FAMILY MEDICINE CLINIC | Facility: HOSPITAL | Age: 41
End: 2021-12-07

## 2021-12-07 DIAGNOSIS — R30.0 DYSURIA: Primary | ICD-10-CM

## 2021-12-07 LAB
SL AMB  POCT GLUCOSE, UA: ABNORMAL
SL AMB LEUKOCYTE ESTERASE,UA: ABNORMAL
SL AMB POCT BILIRUBIN,UA: ABNORMAL
SL AMB POCT BLOOD,UA: ABNORMAL
SL AMB POCT CLARITY,UA: ABNORMAL
SL AMB POCT COLOR,UA: YELLOW
SL AMB POCT KETONES,UA: ABNORMAL
SL AMB POCT NITRITE,UA: ABNORMAL
SL AMB POCT PH,UA: 6
SL AMB POCT SPECIFIC GRAVITY,UA: 1.02
SL AMB POCT URINE PROTEIN: ABNORMAL
SL AMB POCT UROBILINOGEN: ABNORMAL

## 2021-12-07 PROCEDURE — 81002 URINALYSIS NONAUTO W/O SCOPE: CPT | Performed by: INTERNAL MEDICINE

## 2021-12-08 LAB
BACTERIA UR CULT: NORMAL
Lab: NO GROWTH

## 2021-12-16 ENCOUNTER — OFFICE VISIT (OUTPATIENT)
Dept: FAMILY MEDICINE CLINIC | Facility: HOSPITAL | Age: 41
End: 2021-12-16
Payer: COMMERCIAL

## 2021-12-16 VITALS
BODY MASS INDEX: 35.3 KG/M2 | SYSTOLIC BLOOD PRESSURE: 122 MMHG | DIASTOLIC BLOOD PRESSURE: 80 MMHG | TEMPERATURE: 99 F | HEIGHT: 63 IN | WEIGHT: 199.2 LBS | HEART RATE: 83 BPM

## 2021-12-16 DIAGNOSIS — R10.9 FLANK PAIN: ICD-10-CM

## 2021-12-16 DIAGNOSIS — R10.30 LOWER ABDOMINAL PAIN: Primary | ICD-10-CM

## 2021-12-16 DIAGNOSIS — R31.9 HEMATURIA, UNSPECIFIED TYPE: ICD-10-CM

## 2021-12-16 DIAGNOSIS — N30.00 ACUTE CYSTITIS WITHOUT HEMATURIA: ICD-10-CM

## 2021-12-16 LAB
SL AMB  POCT GLUCOSE, UA: NEGATIVE
SL AMB LEUKOCYTE ESTERASE,UA: ABNORMAL
SL AMB POCT BILIRUBIN,UA: NEGATIVE
SL AMB POCT BLOOD,UA: NEGATIVE
SL AMB POCT CLARITY,UA: CLEAR
SL AMB POCT COLOR,UA: YELLOW
SL AMB POCT KETONES,UA: NEGATIVE
SL AMB POCT NITRITE,UA: NEGATIVE
SL AMB POCT PH,UA: 7.5
SL AMB POCT SPECIFIC GRAVITY,UA: 1.01
SL AMB POCT URINE PROTEIN: NEGATIVE
SL AMB POCT UROBILINOGEN: NEGATIVE

## 2021-12-16 PROCEDURE — 1036F TOBACCO NON-USER: CPT | Performed by: FAMILY MEDICINE

## 2021-12-16 PROCEDURE — 81002 URINALYSIS NONAUTO W/O SCOPE: CPT | Performed by: FAMILY MEDICINE

## 2021-12-16 PROCEDURE — 3008F BODY MASS INDEX DOCD: CPT | Performed by: FAMILY MEDICINE

## 2021-12-16 PROCEDURE — 99214 OFFICE O/P EST MOD 30 MIN: CPT | Performed by: FAMILY MEDICINE

## 2021-12-16 RX ORDER — CEPHALEXIN 500 MG/1
500 CAPSULE ORAL EVERY 6 HOURS SCHEDULED
Qty: 28 CAPSULE | Refills: 0 | Status: SHIPPED | OUTPATIENT
Start: 2021-12-16 | End: 2021-12-23

## 2021-12-17 LAB
BACTERIA UR CULT: NORMAL
Lab: NO GROWTH

## 2021-12-19 LAB
C TRACH RRNA SPEC QL NAA+PROBE: NEGATIVE
N GONORRHOEA RRNA SPEC QL NAA+PROBE: NEGATIVE
T VAGINALIS RRNA SPEC QL NAA+PROBE: NEGATIVE

## 2021-12-23 ENCOUNTER — HOSPITAL ENCOUNTER (OUTPATIENT)
Dept: ULTRASOUND IMAGING | Facility: HOSPITAL | Age: 41
Discharge: HOME | End: 2021-12-23
Payer: COMMERCIAL

## 2021-12-23 DIAGNOSIS — R10.30 LOWER ABDOMINAL PAIN: ICD-10-CM

## 2021-12-23 PROCEDURE — 76770 US EXAM ABDO BACK WALL COMP: CPT

## 2022-01-14 ENCOUNTER — TELEPHONE (OUTPATIENT)
Dept: FAMILY MEDICINE CLINIC | Facility: HOSPITAL | Age: 42
End: 2022-01-14

## 2022-01-14 NOTE — TELEPHONE ENCOUNTER
Patient's  called, very upset about not having results of ultrasound yet  It was done on 12/23/21  I did call radiology, they said it looks like it was read but the results were never put into epic  They are going to look into this  I told Ni Baum that we will call once the results are received  He stated that it is unacceptable, they have been waiting a long time for the results  He said he will be calling back later day

## 2022-01-20 ENCOUNTER — VBI (OUTPATIENT)
Dept: ADMINISTRATIVE | Facility: OTHER | Age: 42
End: 2022-01-20

## 2022-01-20 NOTE — TELEPHONE ENCOUNTER
Upon review of the In Basket request we were able to locate, review, and update the patient chart as requested for Pap Smear (HPV) aka Cervical Cancer Screening  Any additional questions or concerns should be emailed to the Practice Liaisons via [email or web address] email, please do not reply via In Basket      Thank you  Regine Gore

## 2022-02-03 ENCOUNTER — OFFICE VISIT (OUTPATIENT)
Dept: OBGYN CLINIC | Facility: CLINIC | Age: 42
End: 2022-02-03
Payer: COMMERCIAL

## 2022-02-03 VITALS
SYSTOLIC BLOOD PRESSURE: 108 MMHG | WEIGHT: 199.8 LBS | HEIGHT: 63 IN | BODY MASS INDEX: 35.4 KG/M2 | DIASTOLIC BLOOD PRESSURE: 62 MMHG

## 2022-02-03 DIAGNOSIS — N76.3 SUBACUTE VULVITIS: Primary | ICD-10-CM

## 2022-02-03 PROCEDURE — 1036F TOBACCO NON-USER: CPT | Performed by: OBSTETRICS & GYNECOLOGY

## 2022-02-03 PROCEDURE — 3008F BODY MASS INDEX DOCD: CPT | Performed by: OBSTETRICS & GYNECOLOGY

## 2022-02-03 PROCEDURE — 99213 OFFICE O/P EST LOW 20 MIN: CPT | Performed by: OBSTETRICS & GYNECOLOGY

## 2022-02-21 PROBLEM — N76.3 SUBACUTE VULVITIS: Status: ACTIVE | Noted: 2022-02-03

## 2022-02-21 NOTE — PROGRESS NOTES
[id number] [facility]   [address] [place] [address], [facility]    Assessment/Plan:  There are no diagnoses linked to this encounter  Subjective:   Anson Schmidt is a 37 y o  [id number]   CC:   Chief Complaint   Patient presents with    Gynecology Problem     vaginal burning / itching  past 2 months       HPI: Vulvar itching, on and off for approximately 2 months  Denies D/C, odor or lesions    ROS: Negative except as noted in HPI    Patient's last menstrual period was 01/27/2022 (exact date)  She  reports being sexually active and has had partner(s) who are male  She reports using the following method of birth control/protection: Female Sterilization  The following portions of the patient's history were reviewed and updated as appropriate:   Past Medical History:   Diagnosis Date    Carpal tunnel syndrome, unspecified upper limb     Cholelithiasis     Last assessed 1/7/2014     Chronic constipation     Last assessed 4/1/2017     Dental disorder     Last assessed 1/28/2016     Dysuria     Resolved 8/11/2015     Fibromyalgia     Hypoglycemia     Numbness and tingling of left side of face     Last assessed 12/11/2017     Numbness and tingling of left upper extremity     Last assessed 12/11/2017     Numbness of left lower extremity     Last assessed 12/11/2017     Right facial numbness     Resolved 11/6/2017     Tingling     Last assessed 21/89/7440     Umbilical hernia      Past Surgical History:   Procedure Laterality Date    CHOLECYSTECTOMY      HERNIA REPAIR  77/18/1028    Umbilical hernia repair   Managed by: Traci Le (General Surgery)     LAPAROSCOPIC CHOLECYSTECTOMY  01/16/2014    Managed by: Traci Le (General Surgery)     TUBAL LIGATION  2013     Family History   Problem Relation Age of Onset    Anemia Mother     Hypertension Mother     Diabetes type II Mother     Diabetes Mother     Rheum arthritis Mother     Diabetes type II Father     Diabetes Father    [employer] Stroke Father     Arthritis Family     Coronary artery disease Family     Diabetes Family     Hyperlipidemia Family     Osteoporosis Family     Asthma Maternal Grandmother     Alzheimer's disease Paternal Grandmother 71    Heart disease Paternal Grandfather      Social History     Socioeconomic History    Marital status: /Civil Union     Spouse name: None    Number of children: None    Years of education: None    Highest education level: None   Occupational History    None   Tobacco Use    Smoking status: Never Smoker    Smokeless tobacco: Never Used   Vaping Use    Vaping Use: Never used   Substance and Sexual Activity    Alcohol use: No    Drug use: No    Sexual activity: Yes     Partners: Male     Birth control/protection: Female Sterilization     Comment: no new partner in past year   Other Topics Concern    None   Social History Narrative    Caffeine use    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 - As per [employer]         Exercise: Occassionally    Domestic violence: No         Social Determinants of Health     Financial Resource Strain: Not on file   Food Insecurity: Not on file   Transportation Needs: Not on file   Physical Activity: Not on file   Stress: Not on file   Social Connections: Not on file   Intimate Partner Violence: Not on file   Housing Stability: Not on file     Outpatient Medications Marked as Taking for the 2/3/22 encounter (Office Visit) with Diego Carpenter MD   Medication    ELISSA MICROLET LANCETS lancets    fluticasone (FLONASE) 50 mcg/act nasal spray    glucose blood (ELISSA CONTOUR TEST) test strip    multivitamin (THERAGRAN) TABS    omeprazole (PriLOSEC) 10 mg delayed release capsule    ranitidine (ZANTAC) 150 mg tablet    triamcinolone (KENALOG) 0 1 % oral topical paste     Allergies   Allergen Reactions    Morphine      "feels funny"    Prednisone Rash           Objective:  /62 (BP Location: Left arm, Patient Position: Sitting, Cuff Size: Large)  5' 2 5" (1 588 m)   Wt 90 6 kg (199 lb 12 8 oz)   LMP 01/27/2022 (Exact Date)   Breastfeeding No   BMI 35 96 kg/m²        Chaperone present? Yes:      General Appearance: alert and oriented, in no acute distress  Abdomen: Soft, non-tender, non-distended, no masses, no rebound or guarding  Pelvic:       External genitalia: Mild erythema, no abnormal pigmentation, no lesions or masses  Normal Bartholin's and [place]'s  Urinary system: Urethral meatus normal, bladder non-tender  Vaginal: normal mucosa without prolapse or lesions  Normal-appearing physiologic discharge  Cervix: Normal-appearing, well-epithelialized, no gross lesions or masses  No cervical motion tenderness  Adnexa: No adnexal masses or tenderness noted  Uterus: Normal-sized, regular contour, midline, mobile, no uterine tenderness  Extremities: Normal range of motion     Skin: normal, no rash or abnormalities  Neurologic: alert, oriented x3  Psychiatric: Appropriate affect, mood stable, cooperative with exam         Win Chicas MD

## 2022-02-21 NOTE — ASSESSMENT & PLAN NOTE
No evidence of fungal infection  Rec avoiding hair removal and to begin OTC hydrocortisone 1% BID for 2 weeks   Expect improvement by then

## 2022-05-25 ENCOUNTER — OFFICE VISIT (OUTPATIENT)
Dept: FAMILY MEDICINE CLINIC | Facility: HOSPITAL | Age: 42
End: 2022-05-25
Payer: COMMERCIAL

## 2022-05-25 VITALS
OXYGEN SATURATION: 98 % | SYSTOLIC BLOOD PRESSURE: 122 MMHG | BODY MASS INDEX: 36.43 KG/M2 | HEIGHT: 63 IN | DIASTOLIC BLOOD PRESSURE: 64 MMHG | TEMPERATURE: 98.7 F | HEART RATE: 88 BPM | WEIGHT: 205.6 LBS

## 2022-05-25 DIAGNOSIS — R39.9 UTI SYMPTOMS: Primary | ICD-10-CM

## 2022-05-25 LAB
SL AMB  POCT GLUCOSE, UA: ABNORMAL
SL AMB LEUKOCYTE ESTERASE,UA: ABNORMAL
SL AMB POCT BILIRUBIN,UA: ABNORMAL
SL AMB POCT BLOOD,UA: ABNORMAL
SL AMB POCT CLARITY,UA: CLEAR
SL AMB POCT COLOR,UA: YELLOW
SL AMB POCT KETONES,UA: ABNORMAL
SL AMB POCT NITRITE,UA: ABNORMAL
SL AMB POCT PH,UA: 6
SL AMB POCT SPECIFIC GRAVITY,UA: 1.03
SL AMB POCT URINE PROTEIN: ABNORMAL
SL AMB POCT UROBILINOGEN: ABNORMAL

## 2022-05-25 PROCEDURE — 3008F BODY MASS INDEX DOCD: CPT | Performed by: NURSE PRACTITIONER

## 2022-05-25 PROCEDURE — 99214 OFFICE O/P EST MOD 30 MIN: CPT | Performed by: NURSE PRACTITIONER

## 2022-05-25 PROCEDURE — 81002 URINALYSIS NONAUTO W/O SCOPE: CPT | Performed by: NURSE PRACTITIONER

## 2022-05-25 PROCEDURE — 1036F TOBACCO NON-USER: CPT | Performed by: NURSE PRACTITIONER

## 2022-05-25 RX ORDER — CEPHALEXIN 500 MG/1
500 CAPSULE ORAL EVERY 6 HOURS SCHEDULED
Qty: 20 CAPSULE | Refills: 0 | Status: SHIPPED | OUTPATIENT
Start: 2022-05-25 | End: 2022-05-30

## 2022-05-25 NOTE — PROGRESS NOTES
Assessment/Plan:     Urine dip showed blood but otherwise normal    Will presumptively treat for UTI and send urine for culture  If culture is negative and symptoms persist will send to urology given she has already had imaging which was negative  Diagnoses and all orders for this visit:    UTI symptoms  -     cephalexin (KEFLEX) 500 mg capsule; Take 1 capsule (500 mg total) by mouth every 6 (six) hours for 5 days  -     Urine culture  -     POCT urine dip          Subjective:     Patient ID: Cody Costello is a 37 y o  female  Having dysuria and frequency that started 2 days ago  Yesterday was worse  Feels bloated  Some lower abdominal discomfort  Denies blood in urine  Currently she is not menstrual   No fever or chills  No back pain  Reports she was away this weekend and the place she was staying she did not like the toilet paper  Had similar symptoms in December  Treated for UTI but urine culture was negative along with STD screening  She also had an US bladder/kidneys which was negative  Review of Systems   Constitutional: Negative for chills and fever  Gastrointestinal: Positive for abdominal pain  Genitourinary: Positive for dysuria and frequency  Negative for flank pain, hematuria and vaginal bleeding  Musculoskeletal: Negative for back pain  The following portions of the patient's history were reviewed and updated as appropriate: allergies, current medications, past family history, past medical history, past social history, past surgical history and problem list     Objective:  Vitals:    05/25/22 1505   BP: 122/64   Pulse: 88   Temp: 98 7 °F (37 1 °C)   SpO2: 98%      Physical Exam  Vitals reviewed  Constitutional:       Appearance: Normal appearance  She is obese  Cardiovascular:      Rate and Rhythm: Normal rate and regular rhythm  Heart sounds: Normal heart sounds  No murmur heard    Pulmonary:      Effort: Pulmonary effort is normal       Breath sounds: Normal breath sounds  Abdominal:      General: Bowel sounds are normal       Palpations: Abdomen is soft  There is no hepatomegaly or splenomegaly  Tenderness: There is abdominal tenderness in the left lower quadrant  There is no right CVA tenderness or left CVA tenderness  Skin:     General: Skin is warm and dry  Neurological:      Mental Status: She is alert and oriented to person, place, and time  Psychiatric:         Mood and Affect: Mood normal          Behavior: Behavior normal          Thought Content:  Thought content normal          Judgment: Judgment normal

## 2022-05-27 LAB
BACTERIA UR CULT: NORMAL
Lab: NO GROWTH

## 2022-07-26 ENCOUNTER — OFFICE VISIT (OUTPATIENT)
Dept: FAMILY MEDICINE CLINIC | Facility: HOSPITAL | Age: 42
End: 2022-07-26
Payer: COMMERCIAL

## 2022-07-26 VITALS
DIASTOLIC BLOOD PRESSURE: 72 MMHG | TEMPERATURE: 97.3 F | SYSTOLIC BLOOD PRESSURE: 116 MMHG | WEIGHT: 208 LBS | BODY MASS INDEX: 36.86 KG/M2 | HEIGHT: 63 IN | HEART RATE: 72 BPM | OXYGEN SATURATION: 98 %

## 2022-07-26 DIAGNOSIS — M79.89 LEG SWELLING: Primary | ICD-10-CM

## 2022-07-26 DIAGNOSIS — E66.9 OBESITY (BMI 30-39.9): ICD-10-CM

## 2022-07-26 PROCEDURE — 99213 OFFICE O/P EST LOW 20 MIN: CPT | Performed by: NURSE PRACTITIONER

## 2022-07-26 NOTE — PROGRESS NOTES
Assessment/Plan:     Minimal to no swelling on exam today  We discussed that leg edema in the heat is not uncommon  We discussed ways to reduce this like low sodium diet, increased water intake, weight loss  Can use OTC compression socks and elevated legs as much as possible  Diagnoses and all orders for this visit:    Leg swelling    Obesity (BMI 30-39  9)  Comments:  Card given for nutritionist            Subjective:     Patient ID: Keith Pickard is a 37 y o  female  Started with ankle swelling about 1 month ago after walking on a hot day  Was left ankle  After 1 week it went away  Came back again when she was active and it was hot  This time it was both ankles  She admits to highly processed food diet  She denies any pain  States today they are better because her  massaged her legs and feet  States only occurs in the summer  Review of Systems   Respiratory: Negative for shortness of breath  Cardiovascular: Positive for leg swelling  Negative for chest pain and palpitations  Musculoskeletal: Negative for arthralgias  The following portions of the patient's history were reviewed and updated as appropriate: allergies, current medications, past family history, past medical history, past social history, past surgical history and problem list     Objective:  Vitals:    07/26/22 1438   BP: 116/72   Pulse: 72   Temp: (!) 97 3 °F (36 3 °C)   SpO2: 98%      Physical Exam  Vitals reviewed  Constitutional:       Appearance: Normal appearance  She is obese  Cardiovascular:      Rate and Rhythm: Normal rate and regular rhythm  Heart sounds: Normal heart sounds  No murmur heard  Pulmonary:      Effort: Pulmonary effort is normal       Breath sounds: Normal breath sounds  Musculoskeletal:      Right lower leg: No edema  Left lower leg: No edema  Skin:     General: Skin is warm and dry  Neurological:      Mental Status: She is alert and oriented to person, place, and time  Psychiatric:         Mood and Affect: Mood normal          Behavior: Behavior normal          Thought Content:  Thought content normal          Judgment: Judgment normal

## 2022-11-30 ENCOUNTER — OFFICE VISIT (OUTPATIENT)
Dept: FAMILY MEDICINE CLINIC | Facility: HOSPITAL | Age: 42
End: 2022-11-30

## 2022-11-30 VITALS
TEMPERATURE: 98.4 F | SYSTOLIC BLOOD PRESSURE: 128 MMHG | OXYGEN SATURATION: 97 % | BODY MASS INDEX: 37.85 KG/M2 | DIASTOLIC BLOOD PRESSURE: 80 MMHG | HEART RATE: 70 BPM | WEIGHT: 213.6 LBS | HEIGHT: 63 IN

## 2022-11-30 DIAGNOSIS — M26.621 ARTHRALGIA OF RIGHT TEMPOROMANDIBULAR JOINT: Primary | ICD-10-CM

## 2022-11-30 DIAGNOSIS — R73.01 IMPAIRED FASTING BLOOD SUGAR: ICD-10-CM

## 2022-11-30 RX ORDER — NAPROXEN 500 MG/1
500 TABLET ORAL 2 TIMES DAILY WITH MEALS
Qty: 20 TABLET | Refills: 0 | Status: SHIPPED | OUTPATIENT
Start: 2022-11-30

## 2022-11-30 RX ORDER — AMOXICILLIN 875 MG/1
875 TABLET, COATED ORAL EVERY 12 HOURS
COMMUNITY
Start: 2022-11-29

## 2022-11-30 NOTE — PROGRESS NOTES
Assessment/Plan:     I suspect TMJ arthralgia  Will start her on naproxen  Recommend heat  Soft foods  Avoid any excessive jaw movements  Refer to PT  I advised she stop amoxicillin since there is no infection  Diagnoses and all orders for this visit:    Arthralgia of right temporomandibular joint  -     naproxen (Naprosyn) 500 mg tablet; Take 1 tablet (500 mg total) by mouth 2 (two) times a day with meals  -     Ambulatory Referral to Physical Therapy; Future    Other orders  -     amoxicillin (AMOXIL) 875 mg tablet; Take 875 mg by mouth every 12 (twelve) hours          Subjective:     Patient ID: Patrick Narayanan is a 40 y o  female   kissed her on the side of the face and bumped her with his nose and had bad pain right away  Occurred 1 week ago  Now pain radiates down right side of neck  Hurts to touch the area  Went to dentist and had xray which was normal  They told her there was no infection but gave her an antibiotic anyway  They told her she needs a root canal lower right  She admits to jaw clicking and clenching  She has been taking ibuprofen w/o relief  Review of Systems   Constitutional: Negative for chills and fever  HENT: Positive for dental problem  Negative for ear pain  Jaw pain-right side         The following portions of the patient's history were reviewed and updated as appropriate: allergies, current medications, past family history, past medical history, past social history, past surgical history and problem list     Objective:  Vitals:    11/30/22 1604   BP: 128/80   Pulse: 70   Temp: 98 4 °F (36 9 °C)   SpO2: 97%      Physical Exam  Vitals reviewed  Constitutional:       Appearance: Normal appearance  HENT:      Head:      Jaw: Tenderness (right) and pain on movement (right) present  No trismus, swelling or malocclusion        Right Ear: Tympanic membrane, ear canal and external ear normal       Left Ear: Tympanic membrane, ear canal and external ear normal       Mouth/Throat:      Mouth: Mucous membranes are moist       Dentition: Normal dentition  No dental tenderness, gingival swelling, dental caries, dental abscesses or gum lesions  Pharynx: Oropharynx is clear  Cardiovascular:      Rate and Rhythm: Normal rate and regular rhythm  Heart sounds: Normal heart sounds  No murmur heard  Pulmonary:      Effort: Pulmonary effort is normal       Breath sounds: Normal breath sounds  Skin:     General: Skin is warm and dry  Neurological:      Mental Status: She is alert and oriented to person, place, and time  Psychiatric:         Mood and Affect: Mood normal          Behavior: Behavior normal          Thought Content:  Thought content normal          Judgment: Judgment normal

## 2022-12-02 DIAGNOSIS — R73.01 IMPAIRED FASTING BLOOD SUGAR: ICD-10-CM

## 2022-12-02 RX ORDER — SYRING-NEEDL,DISP,INSUL,0.3 ML 30 GX5/16"
SYRINGE, EMPTY DISPOSABLE MISCELLANEOUS
Qty: 100 EACH | Refills: 1 | Status: SHIPPED | OUTPATIENT
Start: 2022-12-02

## 2023-07-24 ENCOUNTER — OFFICE VISIT (OUTPATIENT)
Dept: FAMILY MEDICINE CLINIC | Facility: HOSPITAL | Age: 43
End: 2023-07-24
Payer: COMMERCIAL

## 2023-07-24 VITALS
SYSTOLIC BLOOD PRESSURE: 124 MMHG | TEMPERATURE: 98.5 F | HEART RATE: 78 BPM | HEIGHT: 63 IN | WEIGHT: 201.8 LBS | BODY MASS INDEX: 35.75 KG/M2 | DIASTOLIC BLOOD PRESSURE: 76 MMHG

## 2023-07-24 DIAGNOSIS — K21.9 GASTROESOPHAGEAL REFLUX DISEASE WITHOUT ESOPHAGITIS: ICD-10-CM

## 2023-07-24 DIAGNOSIS — B96.89 BACTERIAL RESPIRATORY INFECTION: Primary | ICD-10-CM

## 2023-07-24 DIAGNOSIS — J98.8 BACTERIAL RESPIRATORY INFECTION: Primary | ICD-10-CM

## 2023-07-24 PROCEDURE — 99214 OFFICE O/P EST MOD 30 MIN: CPT | Performed by: INTERNAL MEDICINE

## 2023-07-24 RX ORDER — AMOXICILLIN AND CLAVULANATE POTASSIUM 875; 125 MG/1; MG/1
1 TABLET, FILM COATED ORAL EVERY 12 HOURS SCHEDULED
Qty: 14 TABLET | Refills: 0 | Status: SHIPPED | OUTPATIENT
Start: 2023-07-24 | End: 2023-07-31

## 2023-07-24 NOTE — PROGRESS NOTES
Name: Terrell Issa      : 1980      MRN: [id number]  Encounter Provider: Jozef Goddard DO  Encounter Date: 2023   Encounter department: [address]     1. Bacterial respiratory infection  Comments:  Due to duration of symptoms despite adequate OTC tx will start Augmentin bid x 7 days, gargles/hot tea/lozenges/rest/fluids encouraged, d/w pt that cough can last 4-6 wks but call if no better at all by end of abx or with any new/worse symptoms  Orders:  -     amoxicillin-clavulanate (AUGMENTIN) 875-125 mg per tablet; Take 1 tablet by mouth every 12 (twelve) hours for 7 days    2. Gastroesophageal reflux disease without esophagitis  Comments:  Urged to take abx with food and to restart OTC Pepcid prn - call with persistent/new/worse symptoms       Father passed away from his illness while she was visiting      [facility] Pt here for an acute visit    Pt here with 3 wks of not feeling well. Symptoms started 3 wks ago with fever (Tm 101). They had been visiting family in [place] and father had pneumonia - son and  and herself were all sick. She notes very sore throat and chest congestion and runny nose. She is coughing and has some SOB and wheezing intermittently. She notes fevers have resolved. She has had no N/V. She has had some intermittent diarrhea. She notes no HA's/sinus pain/sinus pressure. She has been using Mucinex, cough medicine OTC and Tylenol and Advil. She was seen at [facility] and was told viral and con't supportive tx. She has given it a week and con''t to feel no better at all. Review of Systems   Constitutional: Positive for chills, fatigue and fever. HENT: Positive for congestion, rhinorrhea, sore throat and trouble swallowing. Negative for ear pain. Eyes: Negative for discharge and itching. Respiratory: Positive for cough, shortness of breath and wheezing.     Cardiovascular: Negative for chest pain and palpitations. Gastrointestinal: Positive for diarrhea. Negative for abdominal pain, nausea and vomiting.        + reflux   Genitourinary: Negative for difficulty urinating and dysuria. Musculoskeletal: Negative for arthralgias and myalgias. Skin: Negative for rash and wound. Neurological: Negative for dizziness, light-headedness and headaches. Hematological: Negative for adenopathy. Psychiatric/Behavioral: Negative for confusion. Current Outpatient Medications on File Prior to Visit   Medication Sig   • fluticasone (FLONASE) 50 mcg/act nasal spray instill 2 sprays into each nostril once daily (Patient not taking: Reported on 11/30/2022)   • glucose blood (Leslie Contour Test) test strip Check blood sugar daily   • Lancet Device MISC Check blood sugar daily   • multivitamin (THERAGRAN) TABS Take 1 tablet by mouth daily. • naproxen (Naprosyn) 500 mg tablet Take 1 tablet (500 mg total) by mouth 2 (two) times a day with meals   • omeprazole (PriLOSEC) 10 mg delayed release capsule Take 10 mg by mouth daily (Patient not taking: Reported on 5/25/2022)   • ranitidine (ZANTAC) 150 mg tablet Take 1 tablet by mouth as needed (Patient not taking: Reported on 5/25/2022)   • triamcinolone (KENALOG) 0.1 % oral topical paste Apply 1 application topically 3 (three) times a day   • [DISCONTINUED] amoxicillin (AMOXIL) 875 mg tablet Take 875 mg by mouth every 12 (twelve) hours       Objective     /76   Pulse 78   Temp 98.5 °F (36.9 °C)   Ht 5' 2.5" (1.588 m)   Wt 91.5 kg (201 lb 12.8 oz)   BMI 36.32 kg/m²     Physical Exam  Vitals and nursing note reviewed. Constitutional:       General: She is not in acute distress. Appearance: She is well-developed. She is not ill-appearing. HENT:      Head: Normocephalic and atraumatic. Right Ear: Tympanic membrane normal. No middle ear effusion. Tympanic membrane is not erythematous. Left Ear: Tympanic membrane normal.  No middle ear effusion. Tympanic membrane is not erythematous. Mouth/Throat:      Mouth: Mucous membranes are moist.      Pharynx: Pharyngeal swelling and posterior oropharyngeal erythema present. No oropharyngeal exudate. Tonsils: No tonsillar exudate. Eyes:      General:         Right eye: No discharge. Left eye: No discharge. Conjunctiva/sclera: Conjunctivae normal.   Neck:      Trachea: No tracheal deviation. Cardiovascular:      Rate and Rhythm: Normal rate and regular rhythm. Heart sounds: Normal heart sounds. No murmur heard. No friction rub. Pulmonary:      Effort: Pulmonary effort is normal. No respiratory distress. Breath sounds: Normal breath sounds. No wheezing, rhonchi or rales. Musculoskeletal:      Cervical back: Neck supple. Lymphadenopathy:      Cervical: Cervical adenopathy present. Skin:     General: Skin is warm. Findings: No rash. Neurological:      Mental Status: She is alert. Motor: No abnormal muscle tone.    Psychiatric:         Mood and Affect: Mood normal.         Behavior: Behavior normal.       Eliel Hernandez DO

## 2023-08-04 ENCOUNTER — OFFICE VISIT (OUTPATIENT)
Dept: FAMILY MEDICINE CLINIC | Facility: HOSPITAL | Age: 43
End: 2023-08-04
Payer: COMMERCIAL

## 2023-08-04 VITALS
BODY MASS INDEX: 35.79 KG/M2 | SYSTOLIC BLOOD PRESSURE: 104 MMHG | TEMPERATURE: 98.1 F | HEART RATE: 74 BPM | HEIGHT: 63 IN | WEIGHT: 202 LBS | DIASTOLIC BLOOD PRESSURE: 80 MMHG

## 2023-08-04 DIAGNOSIS — B96.89 BACTERIAL RESPIRATORY INFECTION: Primary | ICD-10-CM

## 2023-08-04 DIAGNOSIS — J98.8 BACTERIAL RESPIRATORY INFECTION: Primary | ICD-10-CM

## 2023-08-04 DIAGNOSIS — R73.01 IMPAIRED FASTING BLOOD SUGAR: ICD-10-CM

## 2023-08-04 DIAGNOSIS — M79.7 FIBROMYALGIA: ICD-10-CM

## 2023-08-04 PROCEDURE — 99214 OFFICE O/P EST MOD 30 MIN: CPT | Performed by: FAMILY MEDICINE

## 2023-08-04 RX ORDER — DOXYCYCLINE 100 MG/1
100 TABLET ORAL 2 TIMES DAILY
Qty: 14 TABLET | Refills: 0 | Status: SHIPPED | OUTPATIENT
Start: 2023-08-04 | End: 2023-08-11

## 2023-08-09 NOTE — PROGRESS NOTES
Name: Cherelle Da Silva      : 1980      MRN: [id number]  Encounter Provider: Chacho Rogers MD  Encounter Date: 2023   Encounter department: [address]     1. Bacterial respiratory infection  -     doxycycline (ADOXA) 100 MG tablet; Take 1 tablet (100 mg total) by mouth 2 (two) times a day for 7 days    2. Impaired fasting blood sugar  -     glucose blood (Leslie Contour Test) test strip; Check blood sugar daily  -     CBC; Future  -     Comprehensive metabolic panel; Future  -     Hemoglobin A1C; Future  -     Lipid Panel with Direct LDL reflex; Future  -     TSH, 3rd generation with Free T4 reflex; Future  -     CBC  -     Comprehensive metabolic panel  -     Hemoglobin A1C  -     Lipid Panel with Direct LDL reflex  -     TSH, 3rd generation with Free T4 reflex    3. Fibromyalgia  -     Ambulatory Referral to Rheumatology; Future       patient with recurrence of respiratory infection thus more likely a bacterail infection. Doxy BID x 7 days. Supportive treatment. Reviewed IFG and fibromylagia. Requesting referral to rheumatology. Discussed dietary control and exercise. Update labs as outlined. Subjective      Patient here for fu. Previously treated for respiratory infection, improved with antibioitics. After completing antiboitcs sytmpoms recurred. With sore throat, increase coughing, chest congestion, rpoductive cough. No fever, no chills. Requesting referral to rheumatology for fibromyalgia. On review it has been several years with this diagnosis. Now having more pain. Not wanting to be on any medication for this. Review of Systems   Constitutional: Negative. Negative for activity change, appetite change, chills, diaphoresis, fatigue and fever. HENT: Negative for congestion, facial swelling and sore throat. Respiratory: Negative.   Negative for apnea, cough, chest tightness and shortness of breath. Cardiovascular: Negative. Negative for chest pain and palpitations. Gastrointestinal: Negative. Negative for abdominal distention, abdominal pain, blood in stool, constipation, diarrhea and nausea. Genitourinary: Negative. Negative for difficulty urinating, dysuria, flank pain and frequency. Current Outpatient Medications on File Prior to Visit   Medication Sig   • Lancet Device MISC Check blood sugar daily   • multivitamin (THERAGRAN) TABS Take 1 tablet by mouth daily. • naproxen (Naprosyn) 500 mg tablet Take 1 tablet (500 mg total) by mouth 2 (two) times a day with meals   • triamcinolone (KENALOG) 0.1 % oral topical paste Apply 1 application topically 3 (three) times a day   • fluticasone (FLONASE) 50 mcg/act nasal spray instill 2 sprays into each nostril once daily (Patient not taking: Reported on 11/30/2022)   • omeprazole (PriLOSEC) 10 mg delayed release capsule Take 10 mg by mouth daily (Patient not taking: Reported on 5/25/2022)   • ranitidine (ZANTAC) 150 mg tablet Take 1 tablet by mouth as needed (Patient not taking: Reported on 5/25/2022)       Objective     /80   Pulse 74   Temp 98.1 °F (36.7 °C)   Ht 5' 2.5" (1.588 m)   Wt 91.6 kg (202 lb)   BMI 36.36 kg/m²     Physical Exam  Vitals and nursing note reviewed. Constitutional:       Appearance: She is well-developed. HENT:      Head: Normocephalic. Right Ear: Tympanic membrane and ear canal normal. Tympanic membrane is not erythematous. Left Ear: Tympanic membrane and ear canal normal. Tympanic membrane is not erythematous. Nose: No congestion. Mouth/Throat:      Mouth: Mucous membranes are moist.   Eyes:      Conjunctiva/sclera: Conjunctivae normal.   Cardiovascular:      Rate and Rhythm: Normal rate and regular rhythm. Heart sounds: Normal heart sounds. Pulmonary:      Effort: Pulmonary effort is normal.      Breath sounds: Normal breath sounds.    Abdominal:      General: Bowel sounds are normal.      Palpations: Abdomen is soft. Musculoskeletal:      Cervical back: Normal range of motion and neck supple. Skin:     General: Skin is warm. Capillary Refill: Capillary refill takes less than 2 seconds. Neurological:      General: No focal deficit present. Mental Status: She is alert.    Psychiatric:         Mood and Affect: Mood normal.         Behavior: Behavior normal.       Valerie Lares MD

## 2023-09-18 ENCOUNTER — OFFICE VISIT (OUTPATIENT)
Dept: FAMILY MEDICINE CLINIC | Facility: HOSPITAL | Age: 43
End: 2023-09-18
Payer: COMMERCIAL

## 2023-09-18 VITALS
DIASTOLIC BLOOD PRESSURE: 84 MMHG | BODY MASS INDEX: 36.21 KG/M2 | WEIGHT: 204.4 LBS | HEIGHT: 63 IN | HEART RATE: 72 BPM | TEMPERATURE: 99.3 F | SYSTOLIC BLOOD PRESSURE: 126 MMHG

## 2023-09-18 DIAGNOSIS — J02.9 PHARYNGITIS, UNSPECIFIED ETIOLOGY: Primary | ICD-10-CM

## 2023-09-18 LAB — S PYO AG THROAT QL: NEGATIVE

## 2023-09-18 PROCEDURE — 87880 STREP A ASSAY W/OPTIC: CPT | Performed by: NURSE PRACTITIONER

## 2023-09-18 PROCEDURE — 99214 OFFICE O/P EST MOD 30 MIN: CPT | Performed by: NURSE PRACTITIONER

## 2023-09-18 RX ORDER — DIPHENHYDRAMINE HYDROCHLORIDE AND LIDOCAINE HYDROCHLORIDE AND ALUMINUM HYDROXIDE AND MAGNESIUM HYDRO
10 KIT EVERY 4 HOURS PRN
Qty: 240 ML | Refills: 0 | Status: SHIPPED | OUTPATIENT
Start: 2023-09-18

## 2023-09-18 NOTE — PROGRESS NOTES
Name: Chiquita Avila      : 1980      MRN: [id number]  Encounter Provider: PARKER Gambino  Encounter Date: 2023   Encounter department: [address]     1. Pharyngitis, unspecified etiology  -     diphenhydramine, lidocaine, Al/Mg hydroxide, simethicone (Magic Mouthwash) SUSP; Swish and spit 10 mL every 4 (four) hours as needed for mouth pain or discomfort  -     POCT rapid strepA  -     Throat culture; Future  -     Throat culture      Probable viral pharyngitis given negative rapid strep (& negative in UC yesterday also). Will send cx to confirm  Advise she use magic mouthwash prn and continue ibuprofen prn  Call w/worse or persistent sx's       Subjective      Hasn't felt well for 4 days. Started with sore throat and palate feels swollen. Sees yellow on palate and uvula. Very thirsty this morning. Has headache and pressure in sinuses. Taking ibuprofen and zyrtec. Son on antibiotic for strep throat. She was seen in  yesterday and tested negative yesterday for strep - report available and reviewed. Has not tested for covid. Review of Systems   Constitutional: Negative for chills and fever. HENT: Positive for sinus pressure (in nose/cheeks) and sore throat. Respiratory: Negative for cough. Neurological: Positive for headaches. Current Outpatient Medications on File Prior to Visit   Medication Sig   • glucose blood (Leslie Contour Test) test strip Check blood sugar daily   • Lancet Device MISC Check blood sugar daily   • multivitamin (THERAGRAN) TABS Take 1 tablet by mouth daily.    • naproxen (Naprosyn) 500 mg tablet Take 1 tablet (500 mg total) by mouth 2 (two) times a day with meals   • omeprazole (PriLOSEC) 10 mg delayed release capsule Take 10 mg by mouth daily   • ranitidine (ZANTAC) 150 mg tablet Take 1 tablet by mouth as needed   • fluticasone (FLONASE) 50 mcg/act nasal spray instill 2 sprays into each nostril once daily (Patient not taking: Reported on 11/30/2022)   • triamcinolone (KENALOG) 0.1 % oral topical paste Apply 1 application topically 3 (three) times a day (Patient not taking: Reported on 9/18/2023)       Objective     /84   Pulse 72   Temp 99.3 °F (37.4 °C)   Ht 5' 2.5" (1.588 m)   Wt 92.7 kg (204 lb 6.4 oz)   BMI 36.79 kg/m²       Physical Exam  Vitals reviewed. Constitutional:       General: She is not in acute distress. Appearance: She is well-developed. HENT:      Head: Normocephalic. Nose: No congestion or rhinorrhea. Mouth/Throat:      Mouth: Oral lesions (single ulcer on uvula) present. Pharynx: Posterior oropharyngeal erythema present. No oropharyngeal exudate or uvula swelling. Tonsils: No tonsillar exudate. 1+ on the right. 1+ on the left. Cardiovascular:      Rate and Rhythm: Normal rate and regular rhythm. Heart sounds: No murmur heard. Pulmonary:      Effort: Pulmonary effort is normal. No respiratory distress. Breath sounds: Normal breath sounds. Comments: No cough  Lymphadenopathy:      Cervical: No cervical adenopathy. Skin:     General: Skin is warm and dry. Neurological:      General: No focal deficit present. Mental Status: She is alert and oriented to person, place, and time.    Psychiatric:         Mood and Affect: Mood normal.         Behavior: Behavior normal.          PARKER Sosa

## 2023-09-20 LAB — B-HEM STREP SPEC QL CULT: ABNORMAL

## 2023-09-21 ENCOUNTER — TELEPHONE (OUTPATIENT)
Dept: FAMILY MEDICINE CLINIC | Facility: HOSPITAL | Age: 43
End: 2023-09-21

## 2023-09-21 DIAGNOSIS — J02.0 STREP PHARYNGITIS: Primary | ICD-10-CM

## 2023-09-21 RX ORDER — AMOXICILLIN AND CLAVULANATE POTASSIUM 875; 125 MG/1; MG/1
1 TABLET, FILM COATED ORAL EVERY 12 HOURS SCHEDULED
Qty: 14 TABLET | Refills: 0 | Status: SHIPPED | OUTPATIENT
Start: 2023-09-21 | End: 2023-09-28

## 2023-09-21 NOTE — TELEPHONE ENCOUNTER
Per pharmacist magic mouthwash is no longer a pre-mixed available Rx. It comes in a kit to mix at home and insurances do not cover it.  Spoke to patient, she will ask at pharmacy what is good for a sore throat when she picks up her antibiotics, her strep test was positive

## 2024-07-11 DIAGNOSIS — R73.01 IMPAIRED FASTING BLOOD SUGAR: ICD-10-CM

## 2024-07-11 NOTE — TELEPHONE ENCOUNTER
Medication: glucose blood (Leslie Contour Test) test strip     Dose/Frequency: Check blood sugar daily     Quantity: 100    Pharmacy: [facility], [place]    Office:   [x] PCP/Provider - Dr Franz  [] Speciality/Provider -     Does the patient have enough for 3 days?   [] Yes   [x] No - Send as HP to POD---patient has been out for over a month           Patient  is asking for a call once they are sent to the pharmacy.

## 2024-09-26 ENCOUNTER — OFFICE VISIT (OUTPATIENT)
Dept: PODIATRY | Facility: CLINIC | Age: 44
End: 2024-09-26
Payer: COMMERCIAL

## 2024-09-26 VITALS
HEIGHT: 63 IN | HEART RATE: 82 BPM | BODY MASS INDEX: 36.86 KG/M2 | DIASTOLIC BLOOD PRESSURE: 84 MMHG | WEIGHT: 208 LBS | SYSTOLIC BLOOD PRESSURE: 134 MMHG

## 2024-09-26 DIAGNOSIS — M67.471 GANGLION CYST OF RIGHT FOOT: Primary | ICD-10-CM

## 2024-09-26 DIAGNOSIS — M79.671 PAIN OF RIGHT FOOT: ICD-10-CM

## 2024-09-26 DIAGNOSIS — M79.672 PAIN IN LEFT FOOT: ICD-10-CM

## 2024-09-26 DIAGNOSIS — M72.2 PLANTAR FASCIITIS: ICD-10-CM

## 2024-09-26 PROCEDURE — 20612 ASPIRATE/INJ GANGLION CYST: CPT | Performed by: PODIATRIST

## 2024-09-26 PROCEDURE — 99202 OFFICE O/P NEW SF 15 MIN: CPT | Performed by: PODIATRIST

## 2024-09-26 NOTE — PROGRESS NOTES
Ambulatory Visit  Name: Zayra Hernandez      : 1980      MRN: [id number]  Encounter Provider: Juan Carlos Mcnally DPM  Encounter Date: 2024   Encounter department: [facility] [place]    Assessment & Plan  Ganglion cyst of right foot    Orders:    lidocaine (XYLOCAINE) 1 % injection 1 mL    Foot injection/Aspiration cyst    Plantar fasciitis         Pain in left foot         Pain of right foot         Treatment options for probable ganglion cyst left great toe discussed with patient.  Options consist of monitoring lesion for further growth, aspirating for diagnostic purposes, and surgical excision.  Patient requests aspiration to confirm diagnosis of ganglion cyst.  All risk benefits alternatives and complications discussed with patient.  Patient clearly understands that this type of cyst has a high rate of recurrence.  1 cc of 1% Xylocaine plain infiltrated for anesthesia for aspiration.  Ganglion cyst aspirated as noted below.  Approximately 0.5 cc of clear gelatinous aspirate encountered.  Dry sterile dressing with antibiotic ointment applied.  Discussed treatment options for chronic heel pain, recommend conservative approach initiating stretching icing and range of motion with OTC anti-inflammatories.  Instructions dispensed on stretching icing and range of motion.  Follow-up in 2-3 weeks.      Foot injection    Performed by: Juan Carlos Mcnally DPM  Authorized by: Juan Carlos Mcnally DPM    Procedure:     Other Assisting Provider: No      Verbal consent obtained?: Yes      Risks and benefits: Risks, benefits and alternatives were discussed      Consent given by:  Patient    Time out: Immediately prior to the procedure a time out was called      Patient states understanding of procedure being performed: Yes      Patient identity confirmed:  Verbally with patient    Supporting Documentation:     Indications:  Diagnostic    Procedure Details:    Prep: patient was prepped and draped in usual sterile  "fashion                Ethyl Chloride was applied      Needle size: 18 G G    Ultrasound Guidance: no      Approach:  Dorsal    Cyst Aspiration/Injection: Yes      Cyst Type:  Ganglion    Location: tendon sheath      Tendon Structures: Extensor Hallucis Longus      Injection Information:       Aspirate amount (mL):  0.5    Aspirate:  Clear (Gelatinous clear aspirate)    Patient tolerance:  Patient tolerated the procedure well with no immediate complications    Dressing: sterile dressing applied            History of Present Illness     Zayra Hernandez is a 43 y.o. female who presents tender swollen lump on top of the left great toe and secondary concern of bilateral heel pain which has been present for several months.  Reports the lump on her toe has been gradually growing over the past year.  Heel pain does vary in severity depending on her activity level.  Pain after rest noted.      Review of Systems   Constitutional: Negative.    HENT: Negative.     Eyes: Negative.    Respiratory: Negative.     Cardiovascular: Negative.    Gastrointestinal: Negative.    Endocrine: Negative.    Genitourinary: Negative.    Musculoskeletal:  Positive for joint swelling.        Bilateral heel pain.   Skin:         Palpable cyst left great toe overlying the interphalangeal joint.   Allergic/Immunologic: Negative.    Neurological: Negative.    Hematological: Negative.    Psychiatric/Behavioral: Negative.             Objective     /84 (BP Location: Left arm, Patient Position: Sitting, Cuff Size: Adult)   Pulse 82   Ht 5' 2.5\" (1.588 m) Comment: stated  Wt 94.3 kg (208 lb)   BMI 37.44 kg/m²     Physical Exam  Vitals and nursing note reviewed.   Constitutional:       General: She is not in acute distress.     Appearance: Normal appearance. She is well-developed.   HENT:      Head: Normocephalic and atraumatic.      Nose: Nose normal.   Eyes:      Conjunctiva/sclera: Conjunctivae normal.   Cardiovascular:      Rate and Rhythm: Normal " rate and regular rhythm.      Pulses:           Dorsalis pedis pulses are 1+ on the right side and 1+ on the left side.        Posterior tibial pulses are 1+ on the right side and 1+ on the left side.   Pulmonary:      Effort: Pulmonary effort is normal. No respiratory distress.   Musculoskeletal:         General: Tenderness present. No swelling.      Cervical back: Neck supple.      Comments: Bilateral heels: Both are moderately tender to palpation at plantar medial insertion of plantar fascia.  Also tender slightly distal to that by approximately 1.5 cm.  With subtalar joint or ankle joint range of motion bilateral.    Left great toe: Raised palpable subcutaneous cyst overlying dorsal medial aspect of the left great toe interphalangeal joint.  Cyst approximately measures 1.0 x 1.0 x 0.5 cm.   Feet:      Right foot:      Protective Sensation: 10 sites tested.  10 sites sensed.      Left foot:      Protective Sensation: 10 sites tested.  10 sites sensed.   Skin:     General: Skin is warm and dry.      Capillary Refill: Capillary refill takes 2 to 3 seconds.   Neurological:      General: No focal deficit present.      Mental Status: She is alert.   Psychiatric:         Mood and Affect: Mood normal.

## 2024-09-26 NOTE — LETTER
"PLANTAR FASCIITIS & HEEL PAIN  TENDONITIS: Achilles,  Posterior Tibial,  Peroneal,  Other……  \"Physical Therapy @ Home\" is absolutely necessary to obtain, and sustain a long lasting resolution of your heel pain or tendonitis.  If you perform the following you can greatly accelerate your recovery and reduce the chance of a recurrence, which is not uncommon.  Don't get frustrated it sometimes takes months to resolve 100%    The longer you have had this problem the longer it takes to resolve it…    MEDICATION:   If prescribed take as instructed with food, never on an empty stomach.  Stop taking if you have any side effects. (ie.Rash, GI upset, Acid Reflux/Heartburn)  Motrin/Ibuprofen or ALEVE/Naproxen   Rx NSAIDS (ie. Mobic, Diclofenac, Celebrex etc)  ICE:   Apply daily for 10 min. intervals, waiting 5 min. before next application.   Three applications over 45 min. should be done after work, or immediately following an athletic/strenuous event.  Frozen water bottles can be used to ice and massage rolling on floor.  No heat unless instructed.  STRETCHING:  Any stretching activity of  your Achilles Tendon/Calf muscles in the lower leg will also stretch your Plantar Fasciia.    Hold stretch for 8-10 sec., Repeat 10-12 times per setting, for (5-6) times per day.    Stand arms length away from a wall, place foot to be stretched half a shoe length behind the other, then step forward with the other performing a lazy push-up against the wall.  MASSAGE:  Deep friction technique using moisturizer for 10-15 min. daily.  Circles/Figure Eight's with Toes reversing direction for Tendonitis  ORTHOTICS:   Can be very helpful but varies based on foot structure   Custom or basic off the shelf products.  Many options available.  Powerstep [place] is an effective OTC device ([employer]).  PROPER SHOE SELECTION:  Shoes must have adequate support and structural integrity.  Forget loafers, flip-flops, and slip on flats!  Lace up type shoes are " recommended due to their ability to fit orthotics/arch supports.  Some athletic type sandals are acceptable.  Running type sneakers are the best choice.   (Aamire, New Balance 1540 or 940, ASICS, Toney Adrenaline GTS)  GENERAL ADVICE:   If it hurts slow down or stop the activity.  Avoid certain activities (jumping, climbing ladders, steep inclines/uneven terrain)    Put foot through range of motion if tight prior to getting up after inactivity/resting/sleeping.    Distance running and Treadmills can aggravate the situation and prolong recovery.  Bicycle/Elliptical is better than Jogging/Running.

## 2024-09-27 ENCOUNTER — TELEPHONE (OUTPATIENT)
Age: 44
End: 2024-09-27

## 2024-09-27 NOTE — TELEPHONE ENCOUNTER
Caller: Clyde Cason for Zayra Hernandez    Doctor: Uli    Reason for call: Clyde is calling on behalf of Zayra to let Dr. Mcnally know that the drained cyst has once again filled with fluid.  Can someone reach out to him?  Thank you.     Call back#: [phone]

## 2024-09-28 RX ORDER — LIDOCAINE HYDROCHLORIDE 10 MG/ML
1 INJECTION, SOLUTION INFILTRATION; PERINEURAL ONCE
Status: SHIPPED | OUTPATIENT
Start: 2024-09-28

## 2024-10-07 ENCOUNTER — ANNUAL EXAM (OUTPATIENT)
Dept: OBGYN CLINIC | Facility: CLINIC | Age: 44
End: 2024-10-07
Payer: COMMERCIAL

## 2024-10-07 VITALS
BODY MASS INDEX: 36.82 KG/M2 | SYSTOLIC BLOOD PRESSURE: 126 MMHG | WEIGHT: 207.8 LBS | HEIGHT: 63 IN | DIASTOLIC BLOOD PRESSURE: 82 MMHG

## 2024-10-07 DIAGNOSIS — Z12.31 ENCOUNTER FOR SCREENING MAMMOGRAM FOR MALIGNANT NEOPLASM OF BREAST: ICD-10-CM

## 2024-10-07 DIAGNOSIS — Z12.4 SCREENING FOR MALIGNANT NEOPLASM OF THE CERVIX: ICD-10-CM

## 2024-10-07 DIAGNOSIS — Z01.419 ROUTINE GYNECOLOGICAL EXAMINATION: Primary | ICD-10-CM

## 2024-10-07 PROCEDURE — S0612 ANNUAL GYNECOLOGICAL EXAMINA: HCPCS | Performed by: OBSTETRICS & GYNECOLOGY

## 2024-10-07 NOTE — PROGRESS NOTES
[facility] [place]  [address] [place] [address]    ASSESSMENT/PLAN: Zayra Hernandez is a 43 y.o.  who presents for annual gynecologic exam.    Encounter for routine gynecologic examination  - Routine well woman exam completed today.  - Cervical Cancer Screening: Current ASCCP Guidelines reviewed. Last Pap: 2019 . Next Pap Due: today  - HPV Vaccination status: Not immunized  - Contraceptive counseling discussed.  Current contraception: Tubal  - Breast Cancer Screening: Last Mammogram Not on file, ordered  - Colorectal cancer screening was not ordered.  - The following were reviewed in today's visit: breast self exam and mammography screening ordered    Additional problems addressed during this visit:  1. Routine gynecological examination  2. Encounter for screening mammogram for malignant neoplasm of breast  -     Mammo screening bilateral w 3d and cad; Future  3. Screening for malignant neoplasm of the cervix  -     IGP, Aptima HPV, Rfx 16/18,45      CC:  Annual Gynecologic Examination    HPI: Zayra Hernandez is a 43 y.o.  who presents for annual gynecologic examination.  HPI    The following portions of the patient's history were reviewed and updated as appropriate: She  has a past medical history of Carpal tunnel syndrome, unspecified upper limb, Cholelithiasis, Chronic constipation, Dental disorder, Dysuria, Fibromyalgia, Hypoglycemia, Numbness and tingling of left side of face, Numbness and tingling of left upper extremity, Numbness of left lower extremity, Plantar fasciitis, Right facial numbness, Tingling, and Umbilical hernia.  She  has a past surgical history that includes Cholecystectomy; Laparoscopic cholecystectomy (2014); Hernia repair (2014); and Tubal ligation ().  Her family history includes Alzheimer's disease (age of onset: 69) in her paternal grandmother; Anemia in her mother; Arthritis in her family and mother; Asthma in her maternal grandmother;  "Coronary artery disease in her family; Diabetes in her family, father, maternal grandmother, and mother; Diabetes type II in her father and mother; Heart disease in her paternal grandfather; Hyperlipidemia in her family; Hypertension in her mother; Osteoporosis in her family; Rheum arthritis in her mother; Stroke in her father.  She  reports that she has never smoked. She has never used smokeless tobacco. She reports that she does not drink alcohol and does not use drugs.  Current Outpatient Medications   Medication Sig Dispense Refill    glucose blood test strip Check blood sugar daily 100 strip 1    Lancet Device MISC Check blood sugar daily 100 each 1    multivitamin (THERAGRAN) TABS Take 1 tablet by mouth daily.      omeprazole (PriLOSEC) 10 mg delayed release capsule Take 10 mg by mouth daily      ranitidine (ZANTAC) 150 mg tablet Take 1 tablet by mouth as needed      triamcinolone (KENALOG) 0.1 % oral topical paste Apply 1 application topically 3 (three) times a day (Patient not taking: Reported on 9/18/2023) 5 g 1     Current Facility-Administered Medications   Medication Dose Route Frequency Provider Last Rate Last Admin    lidocaine (XYLOCAINE) 1 % injection 1 mL  1 mL Infiltration Once          She is allergic to morphine and prednisone..    Review of Systems      Objective:  /82 (BP Location: Left arm, Patient Position: Sitting, Cuff Size: Large)   Ht 5' 3\" (1.6 m)   Wt 94.3 kg (207 lb 12.8 oz)   LMP 09/16/2024 (Exact Date)   Breastfeeding No   BMI 36.81 kg/m²    Physical Exam      PE:  General Appearance: alert and oriented, in no acute distress.   HEENT: PERRL, thyroid without masses or tenderness  Breast: No masses, tenderness, skin changes, nipple D/C or axillary or supraclavicular adenopathy  Abdomen: Soft, non-tender, non-distended, no masses, no rebound or guarding.  Pelvic:       External genitalia: Normal appearance, no abnormal pigmentation, no lesions or masses. Normal Bartholin's " and Lowman's.      Urinary system: Urethral meatus normal, bladder non-tender.      Vaginal: normal mucosa without prolapse or lesions. Normal-appearing physiologic discharge      Cervix: Normal-appearing, well-epithelialized, no gross lesions or masses No cervical motion tenderness.      Adnexa: No adnexal masses or tenderness noted.      Uterus: Normal-sized, regular contour, midline, mobile, no uterine tenderness.  Extremities: Normal range of motion.   Skin: normal, no rash or abnormalities  Neurologic: alert, oriented x3  Psychiatric: Appropriate affect, mood stable, cooperative with exam.

## 2024-10-13 LAB
CYTOLOGIST CVX/VAG CYTO: NORMAL
DX ICD CODE: NORMAL
HPV GENOTYPE REFLEX: NORMAL
HPV I/H RISK 4 DNA CVX QL PROBE+SIG AMP: NEGATIVE
OTHER STN SPEC: NORMAL
PATH REPORT.FINAL DX SPEC: NORMAL
SL AMB NOTE:: NORMAL
SL AMB SPECIMEN ADEQUACY: NORMAL
SL AMB TEST METHODOLOGY: NORMAL

## 2024-12-09 ENCOUNTER — OFFICE VISIT (OUTPATIENT)
Dept: FAMILY MEDICINE CLINIC | Facility: HOSPITAL | Age: 44
End: 2024-12-09
Payer: COMMERCIAL

## 2024-12-09 VITALS
RESPIRATION RATE: 16 BRPM | HEIGHT: 63 IN | HEART RATE: 85 BPM | SYSTOLIC BLOOD PRESSURE: 116 MMHG | BODY MASS INDEX: 36.14 KG/M2 | TEMPERATURE: 98.3 F | WEIGHT: 204 LBS | OXYGEN SATURATION: 99 % | DIASTOLIC BLOOD PRESSURE: 78 MMHG

## 2024-12-09 DIAGNOSIS — M54.50 BILATERAL LOW BACK PAIN WITHOUT SCIATICA, UNSPECIFIED CHRONICITY: ICD-10-CM

## 2024-12-09 DIAGNOSIS — R30.0 DYSURIA: Primary | ICD-10-CM

## 2024-12-09 DIAGNOSIS — N76.0 ACUTE VAGINITIS: ICD-10-CM

## 2024-12-09 DIAGNOSIS — R39.15 URINARY URGENCY: ICD-10-CM

## 2024-12-09 PROBLEM — R53.83 FATIGUE: Status: RESOLVED | Noted: 2019-06-12 | Resolved: 2024-12-09

## 2024-12-09 LAB
SL AMB  POCT GLUCOSE, UA: ABNORMAL
SL AMB LEUKOCYTE ESTERASE,UA: ABNORMAL
SL AMB POCT BILIRUBIN,UA: ABNORMAL
SL AMB POCT BLOOD,UA: ABNORMAL
SL AMB POCT CLARITY,UA: CLEAR
SL AMB POCT COLOR,UA: CLEAR
SL AMB POCT KETONES,UA: ABNORMAL
SL AMB POCT NITRITE,UA: ABNORMAL
SL AMB POCT PH,UA: 6.5
SL AMB POCT SPECIFIC GRAVITY,UA: 1.01
SL AMB POCT URINE PROTEIN: ABNORMAL
SL AMB POCT UROBILINOGEN: ABNORMAL

## 2024-12-09 PROCEDURE — 99213 OFFICE O/P EST LOW 20 MIN: CPT | Performed by: INTERNAL MEDICINE

## 2024-12-09 PROCEDURE — 81003 URINALYSIS AUTO W/O SCOPE: CPT | Performed by: INTERNAL MEDICINE

## 2024-12-09 RX ORDER — SULFAMETHOXAZOLE AND TRIMETHOPRIM 800; 160 MG/1; MG/1
1 TABLET ORAL EVERY 12 HOURS SCHEDULED
Qty: 10 TABLET | Refills: 0 | Status: SHIPPED | OUTPATIENT
Start: 2024-12-09 | End: 2024-12-14

## 2024-12-09 RX ORDER — FLUCONAZOLE 150 MG/1
150 TABLET ORAL ONCE
Qty: 1 TABLET | Refills: 0 | Status: SHIPPED | OUTPATIENT
Start: 2024-12-09 | End: 2024-12-09

## 2024-12-09 NOTE — PROGRESS NOTES
Name: Zayra Hernandez      : 1980      MRN: [id number]  Encounter Provider: Iron Bethea MD  Encounter Date: 2024   Encounter department: [facility] [address]  :  Assessment & Plan  Dysuria  Patient presents with a chief complaint of dysuria, sometimes sharp feelings in the urethra once being along with frequency and bilateral groin area pressure type of discomfort.  Symptoms started about 2 weeks ago.  She denies fevers, hematuria, nausea, diarrhea, constipation, signs of GI bleeding.  She also has a burning sensation on her vulva.  He tried to use a Monistat vaginal cream that did not help    Denies history of nephrolithiasis, ultrasound the kidneys from  showed no hydronephrosis, nephrolithiasis.    Patient was in no distress on room air, did not look septic.  Patient had mild suprapubic abdominal tenderness, she had no CVA tenderness.    Urine dip showed leukocytes and blood.    Patient has symptoms of a urinary tract infection but also could have vaginitis.  I sent urine for formal testing with microscopy and cultures.  In the meantime I will treat her with Bactrim and a dose of Diflucan in case she has vaginitis.    If her symptoms persist I advised her to see her GYN.      Orders:    POCT urine dip auto non-scope    UA/M w/rflx Culture, Routine; Future    sulfamethoxazole-trimethoprim (BACTRIM DS) 800-160 mg per tablet; Take 1 tablet by mouth every 12 (twelve) hours for 5 days    Bilateral low back pain without sciatica, unspecified chronicity    Orders:    POCT urine dip auto non-scope    UA/M w/rflx Culture, Routine; Future    Urinary urgency    Orders:    POCT urine dip auto non-scope    UA/M w/rflx Culture, Routine; Future    Acute vaginitis    Orders:    fluconazole (DIFLUCAN) 150 mg tablet; Take 1 tablet (150 mg total) by mouth once for 1 dose           History of Present Illness     HPI  Review of Systems       Objective   /78   Pulse 85   Temp 98.3 °F  "(36.8 °C) (Tympanic)   Resp 16   Ht 5' 3\" (1.6 m)   Wt 92.5 kg (204 lb)   SpO2 99%   BMI 36.14 kg/m²      Physical Exam  Constitutional:       General: She is not in acute distress.     Appearance: She is not toxic-appearing.   Cardiovascular:      Rate and Rhythm: Normal rate and regular rhythm.   Pulmonary:      Effort: Pulmonary effort is normal. No respiratory distress.   Abdominal:      Palpations: Abdomen is soft.      Tenderness: There is abdominal tenderness. There is no right CVA tenderness or left CVA tenderness.   Neurological:      Mental Status: She is alert.         "

## 2024-12-09 NOTE — ASSESSMENT & PLAN NOTE
Patient presents with a chief complaint of dysuria, sometimes sharp feelings in the urethra once being along with frequency and bilateral groin area pressure type of discomfort.  Symptoms started about 2 weeks ago.  She denies fevers, hematuria, nausea, diarrhea, constipation, signs of GI bleeding.  She also has a burning sensation on her vulva.  He tried to use a Monistat vaginal cream that did not help    Denies history of nephrolithiasis, ultrasound the kidneys from 2021 showed no hydronephrosis, nephrolithiasis.    Patient was in no distress on room air, did not look septic.  Patient had mild suprapubic abdominal tenderness, she had no CVA tenderness.    Urine dip showed leukocytes and blood.    Patient has symptoms of a urinary tract infection but also could have vaginitis.  I sent urine for formal testing with microscopy and cultures.  In the meantime I will treat her with Bactrim and a dose of Diflucan in case she has vaginitis.    If her symptoms persist I advised her to see her GYN.      Orders:    POCT urine dip auto non-scope    UA/M w/rflx Culture, Routine; Future    sulfamethoxazole-trimethoprim (BACTRIM DS) 800-160 mg per tablet; Take 1 tablet by mouth every 12 (twelve) hours for 5 days

## 2024-12-12 ENCOUNTER — RESULTS FOLLOW-UP (OUTPATIENT)
Dept: FAMILY MEDICINE CLINIC | Facility: HOSPITAL | Age: 44
End: 2024-12-12

## 2024-12-12 LAB
APPEARANCE UR: CLEAR
BACTERIA UR CULT: NORMAL
BACTERIA URNS QL MICRO: NORMAL
BILIRUB UR QL STRIP: NEGATIVE
CASTS URNS QL MICRO: NORMAL /LPF
COLOR UR: YELLOW
EPI CELLS #/AREA URNS HPF: NORMAL /HPF (ref 0–10)
GLUCOSE UR QL: NEGATIVE
HGB UR QL STRIP: NEGATIVE
KETONES UR QL STRIP: NEGATIVE
LEUKOCYTE ESTERASE UR QL STRIP: ABNORMAL
Lab: NORMAL
MICRO URNS: ABNORMAL
NITRITE UR QL STRIP: NEGATIVE
PH UR STRIP: 7 [PH] (ref 5–7.5)
PROT UR QL STRIP: NEGATIVE
RBC #/AREA URNS HPF: NORMAL /HPF (ref 0–2)
SL AMB URINALYSIS REFLEX: ABNORMAL
SP GR UR: 1.01 (ref 1–1.03)
UROBILINOGEN UR STRIP-ACNC: 0.2 MG/DL (ref 0.2–1)
WBC #/AREA URNS HPF: NORMAL /HPF (ref 0–5)

## 2024-12-12 NOTE — TELEPHONE ENCOUNTER
----- Message from Iron Bethea MD sent at 12/12/2024  9:46 AM EST -----  Please call patient and tell her that urinalysis is not consistent with urinary tract infection.  She does not have a UTI.  she can discontinue Bactrim

## 2024-12-12 NOTE — TELEPHONE ENCOUNTER
Relayed results to pt as per provider message. Pt expressed understanding and would like to know if she should continue DIFLUCAN medication that was given to her.    Please contact to advise.

## 2024-12-24 ENCOUNTER — RESULTS FOLLOW-UP (OUTPATIENT)
Dept: OBGYN CLINIC | Facility: CLINIC | Age: 44
End: 2024-12-24